# Patient Record
Sex: FEMALE | Race: ASIAN | NOT HISPANIC OR LATINO | Employment: UNEMPLOYED | URBAN - METROPOLITAN AREA
[De-identification: names, ages, dates, MRNs, and addresses within clinical notes are randomized per-mention and may not be internally consistent; named-entity substitution may affect disease eponyms.]

---

## 2017-08-31 ENCOUNTER — COMMUNICATION - HEALTHEAST (OUTPATIENT)
Dept: FAMILY MEDICINE | Facility: CLINIC | Age: 67
End: 2017-08-31

## 2017-09-20 ENCOUNTER — OFFICE VISIT - HEALTHEAST (OUTPATIENT)
Dept: FAMILY MEDICINE | Facility: CLINIC | Age: 67
End: 2017-09-20

## 2017-09-20 DIAGNOSIS — Z23 ENCOUNTER TO VACCINATE PATIENT: ICD-10-CM

## 2017-09-20 DIAGNOSIS — R07.89 LEFT-SIDED CHEST WALL PAIN: ICD-10-CM

## 2017-09-20 DIAGNOSIS — Z96.659 PAINFUL TOTAL KNEE REPLACEMENT (H): ICD-10-CM

## 2017-09-20 DIAGNOSIS — F43.10 PTSD (POST-TRAUMATIC STRESS DISORDER): ICD-10-CM

## 2017-09-20 DIAGNOSIS — T84.84XA PAINFUL TOTAL KNEE REPLACEMENT (H): ICD-10-CM

## 2017-09-20 DIAGNOSIS — Z76.89 ESTABLISHING CARE WITH NEW DOCTOR, ENCOUNTER FOR: ICD-10-CM

## 2017-09-20 ASSESSMENT — MIFFLIN-ST. JEOR: SCORE: 980.89

## 2017-11-07 ENCOUNTER — OFFICE VISIT - HEALTHEAST (OUTPATIENT)
Dept: FAMILY MEDICINE | Facility: CLINIC | Age: 67
End: 2017-11-07

## 2017-11-07 DIAGNOSIS — R07.89 LEFT-SIDED CHEST WALL PAIN: ICD-10-CM

## 2017-11-07 DIAGNOSIS — F32.A DEPRESSION: ICD-10-CM

## 2017-11-07 DIAGNOSIS — Z23 ENCOUNTER TO VACCINATE PATIENT: ICD-10-CM

## 2018-01-23 ENCOUNTER — OFFICE VISIT - HEALTHEAST (OUTPATIENT)
Dept: FAMILY MEDICINE | Facility: CLINIC | Age: 68
End: 2018-01-23

## 2018-01-23 DIAGNOSIS — R42 DIZZINESS: ICD-10-CM

## 2018-01-23 DIAGNOSIS — D64.9 ANEMIA, UNSPECIFIED TYPE: ICD-10-CM

## 2018-01-23 LAB
ALBUMIN SERPL-MCNC: 3.4 G/DL (ref 3.5–5)
ALP SERPL-CCNC: 117 U/L (ref 45–120)
ALT SERPL W P-5'-P-CCNC: 15 U/L (ref 0–45)
ANION GAP SERPL CALCULATED.3IONS-SCNC: 10 MMOL/L (ref 5–18)
AST SERPL W P-5'-P-CCNC: 17 U/L (ref 0–40)
BILIRUB SERPL-MCNC: 0.3 MG/DL (ref 0–1)
BUN SERPL-MCNC: 18 MG/DL (ref 8–22)
CALCIUM SERPL-MCNC: 9.1 MG/DL (ref 8.5–10.5)
CHLORIDE BLD-SCNC: 106 MMOL/L (ref 98–107)
CO2 SERPL-SCNC: 27 MMOL/L (ref 22–31)
CREAT SERPL-MCNC: 0.65 MG/DL (ref 0.6–1.1)
ERYTHROCYTE [DISTWIDTH] IN BLOOD BY AUTOMATED COUNT: 11.9 % (ref 11–14.5)
GFR SERPL CREATININE-BSD FRML MDRD: >60 ML/MIN/1.73M2
GLUCOSE BLD-MCNC: 96 MG/DL (ref 70–125)
HCT VFR BLD AUTO: 37.9 % (ref 35–47)
HGB BLD-MCNC: 12.7 G/DL (ref 12–16)
IRON SERPL-MCNC: 68 UG/DL (ref 42–175)
MCH RBC QN AUTO: 31.7 PG (ref 27–34)
MCHC RBC AUTO-ENTMCNC: 33.5 G/DL (ref 32–36)
MCV RBC AUTO: 95 FL (ref 80–100)
PLATELET # BLD AUTO: 282 THOU/UL (ref 140–440)
PMV BLD AUTO: 7.8 FL (ref 7–10)
POTASSIUM BLD-SCNC: 4.1 MMOL/L (ref 3.5–5)
PROT SERPL-MCNC: 6.9 G/DL (ref 6–8)
RBC # BLD AUTO: 4 MILL/UL (ref 3.8–5.4)
SODIUM SERPL-SCNC: 143 MMOL/L (ref 136–145)
TSH SERPL DL<=0.005 MIU/L-ACNC: 0.72 UIU/ML (ref 0.3–5)
WBC: 7.3 THOU/UL (ref 4–11)

## 2018-01-23 ASSESSMENT — MIFFLIN-ST. JEOR: SCORE: 990.82

## 2018-02-06 ENCOUNTER — OFFICE VISIT - HEALTHEAST (OUTPATIENT)
Dept: FAMILY MEDICINE | Facility: CLINIC | Age: 68
End: 2018-02-06

## 2018-02-06 ENCOUNTER — RECORDS - HEALTHEAST (OUTPATIENT)
Dept: GENERAL RADIOLOGY | Facility: CLINIC | Age: 68
End: 2018-02-06

## 2018-02-06 DIAGNOSIS — R42 DIZZINESS: ICD-10-CM

## 2018-02-06 DIAGNOSIS — M67.911 UNSPECIFIED DISORDER OF SYNOVIUM AND TENDON, RIGHT SHOULDER: ICD-10-CM

## 2018-02-06 DIAGNOSIS — M67.911 ROTATOR CUFF DISORDER, RIGHT: ICD-10-CM

## 2018-02-06 DIAGNOSIS — M25.511 SEVERE SHOULDER PAIN, RIGHT: ICD-10-CM

## 2018-02-06 DIAGNOSIS — F32.1 MODERATE SINGLE CURRENT EPISODE OF MAJOR DEPRESSIVE DISORDER (H): ICD-10-CM

## 2018-02-06 DIAGNOSIS — M79.10 MYALGIA: ICD-10-CM

## 2018-02-22 ENCOUNTER — OFFICE VISIT - HEALTHEAST (OUTPATIENT)
Dept: FAMILY MEDICINE | Facility: CLINIC | Age: 68
End: 2018-02-22

## 2018-02-22 ENCOUNTER — RECORDS - HEALTHEAST (OUTPATIENT)
Dept: GENERAL RADIOLOGY | Facility: CLINIC | Age: 68
End: 2018-02-22

## 2018-02-22 ENCOUNTER — RECORDS - HEALTHEAST (OUTPATIENT)
Dept: MAMMOGRAPHY | Facility: CLINIC | Age: 68
End: 2018-02-22

## 2018-02-22 DIAGNOSIS — M79.604 RIGHT LEG PAIN: ICD-10-CM

## 2018-02-22 DIAGNOSIS — Z12.31 VISIT FOR SCREENING MAMMOGRAM: ICD-10-CM

## 2018-02-22 DIAGNOSIS — Z12.31 ENCOUNTER FOR SCREENING MAMMOGRAM FOR MALIGNANT NEOPLASM OF BREAST: ICD-10-CM

## 2018-02-22 DIAGNOSIS — M79.604 PAIN IN RIGHT LEG: ICD-10-CM

## 2018-02-22 DIAGNOSIS — F32.A DEPRESSION: ICD-10-CM

## 2018-02-22 DIAGNOSIS — M25.511 RIGHT SHOULDER PAIN: ICD-10-CM

## 2018-03-07 ENCOUNTER — OFFICE VISIT - HEALTHEAST (OUTPATIENT)
Dept: FAMILY MEDICINE | Facility: CLINIC | Age: 68
End: 2018-03-07

## 2018-03-07 ENCOUNTER — HOME CARE/HOSPICE - HEALTHEAST (OUTPATIENT)
Dept: HOME HEALTH SERVICES | Facility: HOME HEALTH | Age: 68
End: 2018-03-07

## 2018-03-07 DIAGNOSIS — M25.511 RIGHT SHOULDER PAIN: ICD-10-CM

## 2018-03-07 DIAGNOSIS — S82.309A FRACTURE OF DISTAL END OF TIBIA: ICD-10-CM

## 2018-03-07 DIAGNOSIS — M79.604 RIGHT LEG PAIN: ICD-10-CM

## 2018-03-07 DIAGNOSIS — F32.A DEPRESSION: ICD-10-CM

## 2018-03-07 DIAGNOSIS — S42.301A RIGHT HUMERAL FRACTURE: ICD-10-CM

## 2018-03-08 ENCOUNTER — COMMUNICATION - HEALTHEAST (OUTPATIENT)
Dept: HOME HEALTH SERVICES | Facility: HOME HEALTH | Age: 68
End: 2018-03-08

## 2018-03-21 ENCOUNTER — RECORDS - HEALTHEAST (OUTPATIENT)
Dept: ADMINISTRATIVE | Facility: OTHER | Age: 68
End: 2018-03-21

## 2018-03-22 ENCOUNTER — AMBULATORY - HEALTHEAST (OUTPATIENT)
Dept: NURSING | Facility: CLINIC | Age: 68
End: 2018-03-22

## 2018-03-23 ENCOUNTER — COMMUNICATION - HEALTHEAST (OUTPATIENT)
Dept: FAMILY MEDICINE | Facility: CLINIC | Age: 68
End: 2018-03-23

## 2018-04-04 ENCOUNTER — RECORDS - HEALTHEAST (OUTPATIENT)
Dept: ADMINISTRATIVE | Facility: OTHER | Age: 68
End: 2018-04-04

## 2018-04-05 ENCOUNTER — COMMUNICATION - HEALTHEAST (OUTPATIENT)
Dept: FAMILY MEDICINE | Facility: CLINIC | Age: 68
End: 2018-04-05

## 2018-04-05 ENCOUNTER — OFFICE VISIT - HEALTHEAST (OUTPATIENT)
Dept: FAMILY MEDICINE | Facility: CLINIC | Age: 68
End: 2018-04-05

## 2018-04-05 DIAGNOSIS — F32.2 SEVERE SINGLE CURRENT EPISODE OF MAJOR DEPRESSIVE DISORDER, WITHOUT PSYCHOTIC FEATURES (H): ICD-10-CM

## 2018-04-05 DIAGNOSIS — M79.604 RIGHT LEG PAIN: ICD-10-CM

## 2018-04-05 DIAGNOSIS — K59.00 CONSTIPATED: ICD-10-CM

## 2018-04-05 DIAGNOSIS — M25.511 RIGHT SHOULDER PAIN: ICD-10-CM

## 2018-04-05 ASSESSMENT — MIFFLIN-ST. JEOR: SCORE: 1010.87

## 2018-04-06 ENCOUNTER — RECORDS - HEALTHEAST (OUTPATIENT)
Dept: ADMINISTRATIVE | Facility: OTHER | Age: 68
End: 2018-04-06

## 2018-04-13 ENCOUNTER — COMMUNICATION - HEALTHEAST (OUTPATIENT)
Dept: FAMILY MEDICINE | Facility: CLINIC | Age: 68
End: 2018-04-13

## 2018-04-26 ENCOUNTER — HOSPITAL ENCOUNTER (OUTPATIENT)
Dept: MAMMOGRAPHY | Facility: CLINIC | Age: 68
Discharge: HOME | End: 2018-04-26
Attending: FAMILY MEDICINE

## 2018-04-26 DIAGNOSIS — N64.89 BREAST ASYMMETRY: ICD-10-CM

## 2018-04-30 ENCOUNTER — AMBULATORY - HEALTHEAST (OUTPATIENT)
Dept: NURSING | Facility: CLINIC | Age: 68
End: 2018-04-30

## 2018-05-10 ENCOUNTER — RECORDS - HEALTHEAST (OUTPATIENT)
Dept: ADMINISTRATIVE | Facility: OTHER | Age: 68
End: 2018-05-10

## 2018-05-14 ENCOUNTER — COMMUNICATION - HEALTHEAST (OUTPATIENT)
Dept: FAMILY MEDICINE | Facility: CLINIC | Age: 68
End: 2018-05-14

## 2018-05-17 ENCOUNTER — OFFICE VISIT - HEALTHEAST (OUTPATIENT)
Dept: FAMILY MEDICINE | Facility: CLINIC | Age: 68
End: 2018-05-17

## 2018-05-17 DIAGNOSIS — F32.9 MAJOR DEPRESSIVE DISORDER WITH SINGLE EPISODE, REMISSION STATUS UNSPECIFIED: ICD-10-CM

## 2018-05-17 DIAGNOSIS — M25.511 CHRONIC RIGHT SHOULDER PAIN: ICD-10-CM

## 2018-05-17 DIAGNOSIS — Z13.220 SCREENING, LIPID: ICD-10-CM

## 2018-05-17 DIAGNOSIS — Z71.85 IMMUNIZATION COUNSELING: ICD-10-CM

## 2018-05-17 DIAGNOSIS — M79.604 RIGHT LEG PAIN: ICD-10-CM

## 2018-05-17 DIAGNOSIS — G89.29 CHRONIC RIGHT SHOULDER PAIN: ICD-10-CM

## 2018-05-17 LAB
CHOLEST SERPL-MCNC: 241 MG/DL
FASTING STATUS PATIENT QL REPORTED: NO
HDLC SERPL-MCNC: 40 MG/DL
LDLC SERPL CALC-MCNC: 166 MG/DL
TRIGL SERPL-MCNC: 173 MG/DL

## 2018-05-17 ASSESSMENT — MIFFLIN-ST. JEOR: SCORE: 1032.77

## 2018-05-18 ENCOUNTER — COMMUNICATION - HEALTHEAST (OUTPATIENT)
Dept: FAMILY MEDICINE | Facility: CLINIC | Age: 68
End: 2018-05-18

## 2018-06-19 ENCOUNTER — RECORDS - HEALTHEAST (OUTPATIENT)
Dept: ADMINISTRATIVE | Facility: OTHER | Age: 68
End: 2018-06-19

## 2018-08-10 ENCOUNTER — RECORDS - HEALTHEAST (OUTPATIENT)
Dept: ADMINISTRATIVE | Facility: OTHER | Age: 68
End: 2018-08-10

## 2018-08-17 ENCOUNTER — RECORDS - HEALTHEAST (OUTPATIENT)
Dept: ADMINISTRATIVE | Facility: OTHER | Age: 68
End: 2018-08-17

## 2018-08-17 ENCOUNTER — OFFICE VISIT - HEALTHEAST (OUTPATIENT)
Dept: FAMILY MEDICINE | Facility: CLINIC | Age: 68
End: 2018-08-17

## 2018-08-17 DIAGNOSIS — M79.604 RIGHT LEG PAIN: ICD-10-CM

## 2018-08-17 DIAGNOSIS — F32.9 MAJOR DEPRESSIVE DISORDER WITH SINGLE EPISODE, REMISSION STATUS UNSPECIFIED: ICD-10-CM

## 2018-08-17 DIAGNOSIS — S82.309A FRACTURE OF DISTAL END OF TIBIA: ICD-10-CM

## 2018-08-17 DIAGNOSIS — M25.511 CHRONIC RIGHT SHOULDER PAIN: ICD-10-CM

## 2018-08-17 DIAGNOSIS — G89.29 CHRONIC RIGHT SHOULDER PAIN: ICD-10-CM

## 2018-08-17 ASSESSMENT — MIFFLIN-ST. JEOR: SCORE: 1011.23

## 2018-09-25 ENCOUNTER — RECORDS - HEALTHEAST (OUTPATIENT)
Dept: ADMINISTRATIVE | Facility: OTHER | Age: 68
End: 2018-09-25

## 2018-09-25 ENCOUNTER — OFFICE VISIT - HEALTHEAST (OUTPATIENT)
Dept: FAMILY MEDICINE | Facility: CLINIC | Age: 68
End: 2018-09-25

## 2018-09-25 DIAGNOSIS — Z23 NEED FOR IMMUNIZATION AGAINST INFLUENZA: ICD-10-CM

## 2018-09-25 DIAGNOSIS — K59.01 SLOW TRANSIT CONSTIPATION: ICD-10-CM

## 2018-09-25 DIAGNOSIS — M79.604 RIGHT LEG PAIN: ICD-10-CM

## 2018-09-25 DIAGNOSIS — F32.9 MAJOR DEPRESSIVE DISORDER WITH SINGLE EPISODE, REMISSION STATUS UNSPECIFIED: ICD-10-CM

## 2018-09-25 ASSESSMENT — MIFFLIN-ST. JEOR: SCORE: 1022.57

## 2018-10-08 ENCOUNTER — OFFICE VISIT - HEALTHEAST (OUTPATIENT)
Dept: FAMILY MEDICINE | Facility: CLINIC | Age: 68
End: 2018-10-08

## 2018-10-08 DIAGNOSIS — N39.0 URINARY TRACT INFECTION: ICD-10-CM

## 2018-10-08 DIAGNOSIS — R30.0 DYSURIA: ICD-10-CM

## 2018-10-08 LAB
ALBUMIN UR-MCNC: ABNORMAL MG/DL
APPEARANCE UR: ABNORMAL
BACTERIA #/AREA URNS HPF: ABNORMAL HPF
BILIRUB UR QL STRIP: NEGATIVE
COLOR UR AUTO: YELLOW
GLUCOSE UR STRIP-MCNC: NEGATIVE MG/DL
HGB UR QL STRIP: ABNORMAL
KETONES UR STRIP-MCNC: ABNORMAL MG/DL
LEUKOCYTE ESTERASE UR QL STRIP: ABNORMAL
NITRATE UR QL: POSITIVE
PH UR STRIP: 6 [PH] (ref 5–8)
RBC #/AREA URNS AUTO: ABNORMAL HPF
SP GR UR STRIP: 1.01 (ref 1–1.03)
SQUAMOUS #/AREA URNS AUTO: ABNORMAL LPF
UROBILINOGEN UR STRIP-ACNC: ABNORMAL
WBC #/AREA URNS AUTO: ABNORMAL HPF

## 2018-10-08 ASSESSMENT — MIFFLIN-ST. JEOR: SCORE: 1008.96

## 2018-10-11 LAB — BACTERIA SPEC CULT: ABNORMAL

## 2018-12-18 ENCOUNTER — OFFICE VISIT - HEALTHEAST (OUTPATIENT)
Dept: FAMILY MEDICINE | Facility: CLINIC | Age: 68
End: 2018-12-18

## 2018-12-18 DIAGNOSIS — F32.9 MAJOR DEPRESSIVE DISORDER WITH SINGLE EPISODE, REMISSION STATUS UNSPECIFIED: ICD-10-CM

## 2018-12-18 DIAGNOSIS — K59.01 SLOW TRANSIT CONSTIPATION: ICD-10-CM

## 2018-12-18 DIAGNOSIS — M79.604 RIGHT LEG PAIN: ICD-10-CM

## 2018-12-18 DIAGNOSIS — M25.511 CHRONIC RIGHT SHOULDER PAIN: ICD-10-CM

## 2018-12-18 DIAGNOSIS — G89.29 CHRONIC RIGHT SHOULDER PAIN: ICD-10-CM

## 2018-12-18 ASSESSMENT — MIFFLIN-ST. JEOR: SCORE: 1016.9

## 2018-12-19 ENCOUNTER — RECORDS - HEALTHEAST (OUTPATIENT)
Dept: ADMINISTRATIVE | Facility: OTHER | Age: 68
End: 2018-12-19

## 2018-12-19 ENCOUNTER — COMMUNICATION - HEALTHEAST (OUTPATIENT)
Dept: ADMINISTRATIVE | Facility: CLINIC | Age: 68
End: 2018-12-19

## 2018-12-19 DIAGNOSIS — F32.9 MAJOR DEPRESSIVE DISORDER WITH SINGLE EPISODE, REMISSION STATUS UNSPECIFIED: ICD-10-CM

## 2019-04-15 ENCOUNTER — COMMUNICATION - HEALTHEAST (OUTPATIENT)
Dept: GERIATRIC MEDICINE | Facility: CLINIC | Age: 69
End: 2019-04-15

## 2019-04-17 ENCOUNTER — COMMUNICATION - HEALTHEAST (OUTPATIENT)
Dept: GERIATRIC MEDICINE | Facility: CLINIC | Age: 69
End: 2019-04-17

## 2019-04-18 ENCOUNTER — OFFICE VISIT - HEALTHEAST (OUTPATIENT)
Dept: FAMILY MEDICINE | Facility: CLINIC | Age: 69
End: 2019-04-18

## 2019-04-18 DIAGNOSIS — G89.29 CHRONIC RIGHT SHOULDER PAIN: ICD-10-CM

## 2019-04-18 DIAGNOSIS — Z23 IMMUNIZATION DUE: ICD-10-CM

## 2019-04-18 DIAGNOSIS — F32.9 MAJOR DEPRESSIVE DISORDER WITH SINGLE EPISODE, REMISSION STATUS UNSPECIFIED: ICD-10-CM

## 2019-04-18 DIAGNOSIS — M25.511 CHRONIC RIGHT SHOULDER PAIN: ICD-10-CM

## 2019-04-18 DIAGNOSIS — Z12.11 SCREEN FOR COLON CANCER: ICD-10-CM

## 2019-04-18 DIAGNOSIS — Z12.11 COLON CANCER SCREENING: ICD-10-CM

## 2019-04-18 DIAGNOSIS — M79.604 RIGHT LEG PAIN: ICD-10-CM

## 2019-04-18 ASSESSMENT — MIFFLIN-ST. JEOR: SCORE: 1010.66

## 2019-04-19 ENCOUNTER — RECORDS - HEALTHEAST (OUTPATIENT)
Dept: ADMINISTRATIVE | Facility: OTHER | Age: 69
End: 2019-04-19

## 2019-04-22 ENCOUNTER — COMMUNICATION - HEALTHEAST (OUTPATIENT)
Dept: GERIATRIC MEDICINE | Facility: CLINIC | Age: 69
End: 2019-04-22

## 2019-08-02 ENCOUNTER — OFFICE VISIT - HEALTHEAST (OUTPATIENT)
Dept: FAMILY MEDICINE | Facility: CLINIC | Age: 69
End: 2019-08-02

## 2019-08-02 DIAGNOSIS — G89.29 CHRONIC RIGHT SHOULDER PAIN: ICD-10-CM

## 2019-08-02 DIAGNOSIS — S42.211D CLOSED DISPLACED FRACTURE OF SURGICAL NECK OF RIGHT HUMERUS WITH ROUTINE HEALING, UNSPECIFIED FRACTURE MORPHOLOGY, SUBSEQUENT ENCOUNTER: ICD-10-CM

## 2019-08-02 DIAGNOSIS — F32.1 CURRENT MODERATE EPISODE OF MAJOR DEPRESSIVE DISORDER, UNSPECIFIED WHETHER RECURRENT (H): ICD-10-CM

## 2019-08-02 DIAGNOSIS — K59.01 SLOW TRANSIT CONSTIPATION: ICD-10-CM

## 2019-08-02 DIAGNOSIS — F32.9 MAJOR DEPRESSIVE DISORDER WITH SINGLE EPISODE, REMISSION STATUS UNSPECIFIED: ICD-10-CM

## 2019-08-02 DIAGNOSIS — E78.5 HYPERLIPIDEMIA LDL GOAL <160: ICD-10-CM

## 2019-08-02 DIAGNOSIS — M25.511 CHRONIC RIGHT SHOULDER PAIN: ICD-10-CM

## 2019-08-02 LAB
ANION GAP SERPL CALCULATED.3IONS-SCNC: 8 MMOL/L (ref 5–18)
BUN SERPL-MCNC: 11 MG/DL (ref 8–22)
CALCIUM SERPL-MCNC: 9.8 MG/DL (ref 8.5–10.5)
CHLORIDE BLD-SCNC: 106 MMOL/L (ref 98–107)
CHOLEST SERPL-MCNC: 239 MG/DL
CO2 SERPL-SCNC: 24 MMOL/L (ref 22–31)
CREAT SERPL-MCNC: 0.69 MG/DL (ref 0.6–1.1)
FASTING STATUS PATIENT QL REPORTED: YES
GFR SERPL CREATININE-BSD FRML MDRD: >60 ML/MIN/1.73M2
GLUCOSE BLD-MCNC: 95 MG/DL (ref 70–125)
HDLC SERPL-MCNC: 44 MG/DL
LDLC SERPL CALC-MCNC: 162 MG/DL
POTASSIUM BLD-SCNC: 4.2 MMOL/L (ref 3.5–5)
SODIUM SERPL-SCNC: 138 MMOL/L (ref 136–145)
TRIGL SERPL-MCNC: 163 MG/DL

## 2019-08-02 ASSESSMENT — MIFFLIN-ST. JEOR: SCORE: 1001.59

## 2019-08-08 ENCOUNTER — COMMUNICATION - HEALTHEAST (OUTPATIENT)
Dept: CARE COORDINATION | Facility: CLINIC | Age: 69
End: 2019-08-08

## 2019-10-26 ENCOUNTER — COMMUNICATION - HEALTHEAST (OUTPATIENT)
Dept: GERIATRIC MEDICINE | Facility: CLINIC | Age: 69
End: 2019-10-26

## 2019-11-04 ENCOUNTER — COMMUNICATION - HEALTHEAST (OUTPATIENT)
Dept: FAMILY MEDICINE | Facility: CLINIC | Age: 69
End: 2019-11-04

## 2019-11-08 ENCOUNTER — OFFICE VISIT - HEALTHEAST (OUTPATIENT)
Dept: FAMILY MEDICINE | Facility: CLINIC | Age: 69
End: 2019-11-08

## 2019-11-08 DIAGNOSIS — Z00.00 ROUTINE GENERAL MEDICAL EXAMINATION AT A HEALTH CARE FACILITY: ICD-10-CM

## 2019-11-08 DIAGNOSIS — M25.511 CHRONIC RIGHT SHOULDER PAIN: ICD-10-CM

## 2019-11-08 DIAGNOSIS — N39.3 SUI (STRESS URINARY INCONTINENCE, FEMALE): ICD-10-CM

## 2019-11-08 DIAGNOSIS — S42.211K CLOSED DISPLACED FRACTURE OF SURGICAL NECK OF RIGHT HUMERUS WITH NONUNION, UNSPECIFIED FRACTURE MORPHOLOGY, SUBSEQUENT ENCOUNTER: ICD-10-CM

## 2019-11-08 DIAGNOSIS — Z23 NEED FOR IMMUNIZATION AGAINST INFLUENZA: ICD-10-CM

## 2019-11-08 DIAGNOSIS — F32.9 MAJOR DEPRESSIVE DISORDER WITH SINGLE EPISODE, REMISSION STATUS UNSPECIFIED: ICD-10-CM

## 2019-11-08 DIAGNOSIS — G89.29 CHRONIC RIGHT SHOULDER PAIN: ICD-10-CM

## 2019-11-08 ASSESSMENT — PATIENT HEALTH QUESTIONNAIRE - PHQ9: SUM OF ALL RESPONSES TO PHQ QUESTIONS 1-9: 2

## 2019-11-08 ASSESSMENT — MIFFLIN-ST. JEOR: SCORE: 1006.98

## 2020-03-10 ENCOUNTER — OFFICE VISIT - HEALTHEAST (OUTPATIENT)
Dept: FAMILY MEDICINE | Facility: CLINIC | Age: 70
End: 2020-03-10

## 2020-03-10 DIAGNOSIS — E78.5 HYPERLIPIDEMIA LDL GOAL <160: ICD-10-CM

## 2020-03-10 DIAGNOSIS — K59.01 SLOW TRANSIT CONSTIPATION: ICD-10-CM

## 2020-03-10 DIAGNOSIS — S42.201G CLOSED FRACTURE OF PROXIMAL END OF RIGHT HUMERUS WITH DELAYED HEALING, UNSPECIFIED FRACTURE MORPHOLOGY, SUBSEQUENT ENCOUNTER: ICD-10-CM

## 2020-03-10 DIAGNOSIS — F32.9 MAJOR DEPRESSIVE DISORDER WITH SINGLE EPISODE, REMISSION STATUS UNSPECIFIED: ICD-10-CM

## 2020-03-10 LAB
CHOLEST SERPL-MCNC: 252 MG/DL
FASTING STATUS PATIENT QL REPORTED: YES
HDLC SERPL-MCNC: 41 MG/DL
LDLC SERPL CALC-MCNC: 185 MG/DL
TRIGL SERPL-MCNC: 132 MG/DL

## 2020-03-10 ASSESSMENT — MIFFLIN-ST. JEOR: SCORE: 992.52

## 2020-03-11 ENCOUNTER — COMMUNICATION - HEALTHEAST (OUTPATIENT)
Dept: GERIATRIC MEDICINE | Facility: CLINIC | Age: 70
End: 2020-03-11

## 2020-03-11 ASSESSMENT — ACTIVITIES OF DAILY LIVING (ADL): DEPENDENT_IADLS:: CLEANING;COOKING;LAUNDRY;SHOPPING;MEAL PREPARATION;TRANSPORTATION;MONEY MANAGEMENT

## 2020-03-13 ENCOUNTER — COMMUNICATION - HEALTHEAST (OUTPATIENT)
Dept: GERIATRIC MEDICINE | Facility: CLINIC | Age: 70
End: 2020-03-13

## 2020-03-31 ENCOUNTER — COMMUNICATION - HEALTHEAST (OUTPATIENT)
Dept: FAMILY MEDICINE | Facility: CLINIC | Age: 70
End: 2020-03-31

## 2020-07-13 ENCOUNTER — COMMUNICATION - HEALTHEAST (OUTPATIENT)
Dept: GERIATRIC MEDICINE | Facility: CLINIC | Age: 70
End: 2020-07-13

## 2020-07-15 ENCOUNTER — COMMUNICATION - HEALTHEAST (OUTPATIENT)
Dept: GERIATRIC MEDICINE | Facility: CLINIC | Age: 70
End: 2020-07-15

## 2020-07-16 ASSESSMENT — ACTIVITIES OF DAILY LIVING (ADL): DEPENDENT_IADLS:: CLEANING;COOKING;LAUNDRY;SHOPPING;MEAL PREPARATION;MONEY MANAGEMENT;TRANSPORTATION;INCONTINENCE

## 2020-07-20 ENCOUNTER — COMMUNICATION - HEALTHEAST (OUTPATIENT)
Dept: GERIATRIC MEDICINE | Facility: CLINIC | Age: 70
End: 2020-07-20

## 2020-08-11 ENCOUNTER — OFFICE VISIT - HEALTHEAST (OUTPATIENT)
Dept: FAMILY MEDICINE | Facility: CLINIC | Age: 70
End: 2020-08-11

## 2020-08-11 DIAGNOSIS — G89.29 CHRONIC RIGHT SHOULDER PAIN: ICD-10-CM

## 2020-08-11 DIAGNOSIS — F32.9 MAJOR DEPRESSIVE DISORDER WITH SINGLE EPISODE, REMISSION STATUS UNSPECIFIED: ICD-10-CM

## 2020-08-11 DIAGNOSIS — K59.01 SLOW TRANSIT CONSTIPATION: ICD-10-CM

## 2020-08-11 DIAGNOSIS — M79.604 RIGHT LEG PAIN: ICD-10-CM

## 2020-08-11 DIAGNOSIS — M25.511 CHRONIC RIGHT SHOULDER PAIN: ICD-10-CM

## 2020-08-11 RX ORDER — NAPROXEN 500 MG/1
500 TABLET ORAL 2 TIMES DAILY WITH MEALS
Qty: 60 TABLET | Refills: 9 | Status: SHIPPED | OUTPATIENT
Start: 2020-08-11 | End: 2022-08-23

## 2020-08-11 ASSESSMENT — MIFFLIN-ST. JEOR: SCORE: 982.88

## 2020-08-11 ASSESSMENT — PATIENT HEALTH QUESTIONNAIRE - PHQ9: SUM OF ALL RESPONSES TO PHQ QUESTIONS 1-9: 9

## 2020-09-08 ENCOUNTER — OFFICE VISIT - HEALTHEAST (OUTPATIENT)
Dept: FAMILY MEDICINE | Facility: CLINIC | Age: 70
End: 2020-09-08

## 2020-09-08 DIAGNOSIS — Z23 NEED FOR IMMUNIZATION AGAINST INFLUENZA: ICD-10-CM

## 2020-09-08 DIAGNOSIS — F32.9 MAJOR DEPRESSIVE DISORDER WITH SINGLE EPISODE, REMISSION STATUS UNSPECIFIED: ICD-10-CM

## 2020-09-08 DIAGNOSIS — M79.604 RIGHT LEG PAIN: ICD-10-CM

## 2020-09-08 DIAGNOSIS — Z23 INFLUENZA VACCINATION GIVEN: ICD-10-CM

## 2020-09-08 DIAGNOSIS — K59.01 SLOW TRANSIT CONSTIPATION: ICD-10-CM

## 2020-09-08 DIAGNOSIS — M25.511 CHRONIC RIGHT SHOULDER PAIN: ICD-10-CM

## 2020-09-08 DIAGNOSIS — G89.29 CHRONIC RIGHT SHOULDER PAIN: ICD-10-CM

## 2020-09-08 RX ORDER — ACETAMINOPHEN 325 MG/1
650 TABLET ORAL 3 TIMES DAILY
Qty: 100 TABLET | Refills: 2 | Status: SHIPPED | OUTPATIENT
Start: 2020-09-08

## 2020-09-08 RX ORDER — ESCITALOPRAM OXALATE 10 MG/1
10 TABLET ORAL DAILY
Qty: 30 TABLET | Refills: 8 | Status: SHIPPED | OUTPATIENT
Start: 2020-09-08 | End: 2022-06-08

## 2020-09-08 RX ORDER — POLYETHYLENE GLYCOL 3350 17 G/17G
17 POWDER, FOR SOLUTION ORAL DAILY
Qty: 1,700 G | Refills: 6 | Status: SHIPPED | OUTPATIENT
Start: 2020-09-08 | End: 2022-06-08

## 2020-09-08 ASSESSMENT — MIFFLIN-ST. JEOR: SCORE: 993.93

## 2020-12-08 ENCOUNTER — OFFICE VISIT - HEALTHEAST (OUTPATIENT)
Dept: FAMILY MEDICINE | Facility: CLINIC | Age: 70
End: 2020-12-08

## 2021-01-27 ENCOUNTER — RECORDS - HEALTHEAST (OUTPATIENT)
Dept: ADMINISTRATIVE | Facility: OTHER | Age: 71
End: 2021-01-27

## 2021-02-04 ENCOUNTER — COMMUNICATION - HEALTHEAST (OUTPATIENT)
Dept: GERIATRIC MEDICINE | Facility: CLINIC | Age: 71
End: 2021-02-04

## 2021-05-18 ENCOUNTER — AMBULATORY - HEALTHEAST (OUTPATIENT)
Dept: NURSING | Facility: CLINIC | Age: 71
End: 2021-05-18

## 2021-05-24 ENCOUNTER — RECORDS - HEALTHEAST (OUTPATIENT)
Dept: FAMILY MEDICINE | Facility: CLINIC | Age: 71
End: 2021-05-24

## 2021-05-24 DIAGNOSIS — Z12.31 ENCOUNTER FOR SCREENING MAMMOGRAM FOR BREAST CANCER: ICD-10-CM

## 2021-05-26 ASSESSMENT — PATIENT HEALTH QUESTIONNAIRE - PHQ9
SUM OF ALL RESPONSES TO PHQ QUESTIONS 1-9: 9
SUM OF ALL RESPONSES TO PHQ QUESTIONS 1-9: 2

## 2021-05-27 NOTE — PROGRESS NOTES
[facility] Care Coordination Contact  [facility] Home Visit Assessment     Home visit for Health Risk Assessment with Main Morris completed on 4/15/2019     Current Care Plan  Member currently receiving the following home care services:     Member currently receiving the following community resources:    PCA services    Medication Review  Medication reconciliation completed in Epic: YES  Medication set-up & administration: RN set up monthly  Family caregiver administers medications  Medication Risk Assessment Medication (1 or more, place referral to MTM):  N/A: No risk factors identified  MTM Referral Placed: No: No risk factors idenified    Mental/Behavioral Health   Depression Screening: See PHQ assessment flowsheet.          Mental Health Diagnosis: Yes:   Mental Health Services: None: No further intervention needed at this time.    Falls Assessment:no falls reported            ADL/IADL Dependencies:needs assistance with dressing, bathing, mobility and toileting           [facility] Health Plan sponsored benefits: Shared information re: Silver Sneakers/gym memberships, ASA, Calcium +D.    PCA Assessment completed at visit: yes    Elderly Waiver Eligibility: Yes, but member declines EW service; will not open to EW    Care Plan & Recommendations: will continue with formal PCA services.    See [facility] for detailed assessment information.    Follow-Up Plan: Member informed of future contact, plan to f/u with member with a 6 month telephone assessment.  Contact information shared with member and family, encouraged member to call with any questions or concerns at any time.    [place] care continuum providers: Please refer to Health Care Home on the [facility] Problem List to view this patient's [facility] Care Plan Summary.

## 2021-05-27 NOTE — PROGRESS NOTES
[facility] Care Coordination Contact    [facility]:  Emailed completed PCA assessment to [facility].  Secure emailed copy of PCA assessment to PCA Agency and mailed copy to member.  Faxed MD Communication to PCP.     Josefa Arthur  Care Management Specialist  [facility]  [phone]

## 2021-05-28 NOTE — PROGRESS NOTES
[facility] Care Coordination Contact    Received after visit checklist from care coordinator.  Completed following tasks: Mailed copy of care plan to client    Josefa Arthur  Care Management Specialist  [facility]  [phone]

## 2021-05-31 VITALS — WEIGHT: 128 LBS | BODY MASS INDEX: 27.22 KG/M2

## 2021-05-31 VITALS — BODY MASS INDEX: 26.57 KG/M2 | HEIGHT: 58 IN | WEIGHT: 126.56 LBS

## 2021-05-31 VITALS — BODY MASS INDEX: 27.03 KG/M2 | HEIGHT: 58 IN | WEIGHT: 128.75 LBS

## 2021-06-01 VITALS — BODY MASS INDEX: 27.94 KG/M2 | HEIGHT: 58 IN | WEIGHT: 133.1 LBS

## 2021-06-01 VITALS — WEIGHT: 128.06 LBS | BODY MASS INDEX: 27.23 KG/M2

## 2021-06-01 VITALS — BODY MASS INDEX: 27.98 KG/M2 | WEIGHT: 131.56 LBS

## 2021-06-01 VITALS — WEIGHT: 138 LBS | HEIGHT: 58 IN | BODY MASS INDEX: 28.97 KG/M2

## 2021-06-01 VITALS — WEIGHT: 133.25 LBS | HEIGHT: 58 IN | BODY MASS INDEX: 27.97 KG/M2

## 2021-06-01 VITALS — BODY MASS INDEX: 27.5 KG/M2 | WEIGHT: 129.31 LBS

## 2021-06-02 VITALS — HEIGHT: 58 IN | WEIGHT: 134.5 LBS | BODY MASS INDEX: 28.23 KG/M2

## 2021-06-02 VITALS — BODY MASS INDEX: 27.86 KG/M2 | WEIGHT: 132.75 LBS | HEIGHT: 58 IN

## 2021-06-02 VITALS — BODY MASS INDEX: 28.5 KG/M2 | WEIGHT: 135.75 LBS | HEIGHT: 58 IN

## 2021-06-02 NOTE — PROGRESS NOTES
[facility] Care Coordination Contact      [facility] Six-Month Telephone Assessment    6 month telephone assessment completed on 10/25/19.    ER visits: No  Hospitalizations: No  TCU stays: No  Significant health status changes: no, continues with chronic pain and depression.    Falls/Injuries: No  ADL/IADL changes: No  Changes in services: No    Caregiver Assessment follow up:  N/A    Goals: See POC in chart for goal progress documentation.      Will see member in 6 months for an annual health risk assessment.   Encouraged member to call CC with any questions or concerns in the meantime.    Soraida Brown RN  [facility]  [phone]

## 2021-06-03 VITALS
HEART RATE: 74 BPM | SYSTOLIC BLOOD PRESSURE: 120 MMHG | OXYGEN SATURATION: 99 % | TEMPERATURE: 98.3 F | HEIGHT: 58 IN | RESPIRATION RATE: 18 BRPM | WEIGHT: 132.31 LBS | DIASTOLIC BLOOD PRESSURE: 78 MMHG | BODY MASS INDEX: 27.77 KG/M2

## 2021-06-03 VITALS — HEIGHT: 58 IN | WEIGHT: 133.13 LBS | BODY MASS INDEX: 27.95 KG/M2

## 2021-06-03 VITALS — WEIGHT: 131.13 LBS | BODY MASS INDEX: 27.53 KG/M2 | HEIGHT: 58 IN

## 2021-06-03 NOTE — TELEPHONE ENCOUNTER
reports indicate that the patient needs colon cancer screening. If non-English speaking, CMT will schedule patient to discuss colon cancer screening options with PCP. Writer intends to contact the patient to assist in scheduling and appointing for this purpose. Per clinic policy, writer will three times prior to closing encounter.   Patient had ColoGuard ordered in April 2019. Results not received from [facility]. Did patient send this back?

## 2021-06-03 NOTE — TELEPHONE ENCOUNTER
CMT called all available numbers without success as numbers are invalid or non-working.     Patient has physical on 11/8 at [facility], notes updated.

## 2021-06-03 NOTE — PATIENT INSTRUCTIONS - HE
Preventative care:     Labs are up to date.    Flu shot given today.    Chronic right shoulder pain.    Continue naproxen and Tylenol.    Depression:     Continue Lexapro.    Constipation:     Continue Glycolax.    Urinary urgency:     Consider starting medication.    Follow up:     Return in 4 months.

## 2021-06-04 VITALS
DIASTOLIC BLOOD PRESSURE: 66 MMHG | TEMPERATURE: 98.2 F | BODY MASS INDEX: 27.17 KG/M2 | HEIGHT: 58 IN | HEART RATE: 72 BPM | RESPIRATION RATE: 20 BRPM | WEIGHT: 129.44 LBS | SYSTOLIC BLOOD PRESSURE: 112 MMHG

## 2021-06-04 VITALS
HEART RATE: 90 BPM | RESPIRATION RATE: 12 BRPM | HEIGHT: 58 IN | DIASTOLIC BLOOD PRESSURE: 70 MMHG | SYSTOLIC BLOOD PRESSURE: 116 MMHG | BODY MASS INDEX: 26.66 KG/M2 | WEIGHT: 127 LBS | TEMPERATURE: 97.4 F | OXYGEN SATURATION: 93 %

## 2021-06-04 VITALS
HEART RATE: 95 BPM | SYSTOLIC BLOOD PRESSURE: 108 MMHG | OXYGEN SATURATION: 97 % | TEMPERATURE: 98.1 F | WEIGHT: 129.13 LBS | HEIGHT: 58 IN | DIASTOLIC BLOOD PRESSURE: 66 MMHG | RESPIRATION RATE: 20 BRPM | BODY MASS INDEX: 27.1 KG/M2

## 2021-06-06 NOTE — PROGRESS NOTES
[facility] Care Coordination Contact  [facility] Home Visit Assessment     Home visit for Health Risk Assessment with Main Morris completed on 3/11/2020    Type of residence:: Apartment  Current living arrangement:: I live in a private home with spouse and family     Assessment completed with:: Patient, Children, Family, Spouse and .    Current Care Plan  Member currently receiving the following home care services: None  Member currently receiving the following community resources: PCA      Medication Review  Medication reconciliation completed in Epic: YES  Medication set-up & administration: Independent-does not set up  Self-administers medications  Medication Risk Assessment Medication (1 or more, place referral to MTM):  N/A: No risk factors identified  MTM Referral Placed: No: No risk factors idenified    Mental/Behavioral Health   Depression Screening: See PHQ assessment flowsheet.   Mental health DX:: Yes(Depression)   Mental health DX how managed:: Medication  Mental Health Diagnosis: Yes: depression; anxiety  Mental Health Services: None: No further intervention needed at this time.    Falls Assessment:   Fallen 2 or more times in the past year?: No   Any fall with injury in the past year?: No    ADL/IADL Dependencies:   Dependent ADLs:: Ambulation-walker, Bathing, Dressing, Grooming, Incontinence, Transfers  Dependent IADLs:: Cleaning, Cooking, Laundry, Shopping, Meal Preparation, Transportation, Money Management    [facility] Plan sponsored benefits: Shared information re: Silver Sneakers/gym memberships, ASA, Calcium +D.    PCA Assessment completed at visit: Yes    Elderly Waiver Eligibility: Yes, but member declines EW service; will not open to EW    Care Plan & Recommendations: safety goal for mobility.    See [facility] for detailed assessment information.    Follow-Up Plan: Member informed of future contact, plan to f/u with member with a 6 month telephone assessment.  Contact  information shared with member and family, encouraged member to call with any questions or concerns at any time.    Soraida Brown RN  [facility]  [phone]

## 2021-06-06 NOTE — PATIENT INSTRUCTIONS - HE
Hyperlipidemia:     Cholesterol labs ordered.    Eat less fried, oily, or fatty foods.    Chronic right shoulder and right leg pain:     Continue naproxen and Tylenol.     Depression:     Continue Lexapro.    Constipation:     Continue Glycolax.

## 2021-06-06 NOTE — PROGRESS NOTES
[facility] Care Coordination Contact    Member's MA termed 02/29/20; spoke with Grzegorz SHER who said that they sent in paperwork and MA would be eligible in 3 weeks per [facility] financial worker.  Insurance is [facility] MSC+.    Soraida Brown RN  [facility]  [phone]

## 2021-06-06 NOTE — PROGRESS NOTES
ASSESSMENT AND PLAN:  1. Slow transit constipation  Refill the medication for constipation she does have constipation with the use of naproxen  - polyethylene glycol (GLYCOLAX) 17 gram/dose powder; Take 17 g by mouth daily.  Dispense: 1700 g; Refill: 6    2. Major depressive disorder with single episode, remission status unspecified    No nightmares sleeping well no side effects noted from medication  - escitalopram oxalate (LEXAPRO) 10 MG tablet; Take 1 tablet (10 mg total) by mouth daily.  Dispense: 30 tablet; Refill: 8    3. Closed fracture of proximal end of right humerus with delayed healing, unspecified fracture morphology, subsequent encounter    Pain control has been adequate with the naproxen.  Continue the medication she has not had any side effects.    4. Hyperlipidemia LDL goal <160    Rechecking a lipid panel today her last lipid panel was elevated.  Currently she is not on statin.  - Lipid Cascade            Orders Placed This Encounter   Procedures     Lipid Cascade     Order Specific Question:   Fasting is required?     Answer:   Yes     Medications Discontinued During This Encounter   Medication Reason     polyethylene glycol (GLYCOLAX) 17 gram/dose powder Reorder     escitalopram oxalate (LEXAPRO) 10 MG tablet Reorder       Return in about 5 months (around 8/10/2020) for Recheck.    CHIEF COMPLAINT:  Chief Complaint   Patient presents with     Depression       HISTORY OF PRESENT ILLNESS:  Main is a 69 y.o. female  with chronic right shoulder pain, chronic right leg pain related to previous orthopedic injuries, major depression, and slow transit constipation, who presents to the clinic today for follow up on depression. Main is present with a Tracy . She reports that her chronic right shoulder and right leg pain are unchanged, and continues on naproxen and Tylenol for pain management. The patient has occasional constipation.     Her blood pressure is well-controlled today.     Her sleep has  "been okay. Her mood has been controlled.    I reviewed that her lipids from August 2019 were significant for elevated LDL and triglycerides. Mu is not eating pork, but does frequently eat fried foods. She has come in fasting today for labs.     She is no longer having issues with urinary incontinence.     REVIEW OF SYSTEMS:   GI: Occasional constipation.   : No urinary incontinence.  Musculoskeletal: Chronic right shoulder and right leg pain.    Psychiatric: Depression well-controlled.   All other10 point review systems are negative.    PFSH:  She is  and accompanied by her  today. Reviewed as below.     TOBACCO USE:  Social History     Tobacco Use   Smoking Status Current Some Day Smoker   Smokeless Tobacco Current User     Types: Snuff, Chew   Tobacco Comment    betel nut w/o tobacco       VITALS:  Vitals:    03/10/20 0942   BP: 108/66   Pulse: 95   Resp: 20   Temp: 98.1  F (36.7  C)   TempSrc: Oral   SpO2: 97%   Weight: 129 lb 2 oz (58.6 kg)   Height: 4' 9.5\" (1.461 m)     Wt Readings from Last 3 Encounters:   03/10/20 129 lb 2 oz (58.6 kg)   11/08/19 132 lb 5 oz (60 kg)   08/02/19 131 lb 2 oz (59.5 kg)     Body mass index is 27.46 kg/m .    QUALITY MEASURES:  The following are part of a depression follow up plan for the patient:  mental health care assessment and mental health care management    PHYSICAL EXAM:  General: Alert, cooperative, no distress, appears stated age  Head: Normocephalic, without obvious abnormality, atraumatic, moist mucous membranes  Eyes: PERRL, conjunctiva/cornea clear, EOM's intact  Neck: Supple, no cervical lymph node enlargement   Lungs: Clear to auscultation bilaterally, respirations unlabored  Heart: Regular rate and rhythm, S1 and S2 normal, no murmur, rub, or gallop  Neurologic:  A & O x 3.  No tremor, no focal findings.  Uses cane for assistance with ambulation.   Psychiatric: Normal affect, good eye contact, well-groomed  Skin: No rash or suspicious lesions noted " on exposed skin, non-diaphoretic    DATA REVIEWED:  Additional History from Old Records Summarized (2): none  Decision to Obtain Records (1): none  Radiology Tests Summarized or Ordered (1): none  Labs Reviewed or Ordered (1): 08/02/2019 labs reviewed. Labs reviewed.  Medicine Test Summarized or Ordered (1): none  Independent Review of EKG or X-RAY(2 each): none    IHanna, am scribing for and in the presence of, Dr. Mackey.    I, Dr. Mackey, personally performed the services described in this documentation, as scribed by Hanna Núñez in my presence, and it is both accurate and complete.    [place] time spent the patient today 25 minutes greater than 50% of the time was spent discussing depression hyperlipidemia musculoskeletal discomfort and constipation  MEDICATIONS:  Current Outpatient Medications   Medication Sig Dispense Refill     acetaminophen (TYLENOL) 325 MG tablet Take 2 tablets (650 mg total) by mouth 3 (three) times a day. 100 tablet 2     escitalopram oxalate (LEXAPRO) 10 MG tablet Take 1 tablet (10 mg total) by mouth daily. 30 tablet 8     naproxen (EC NAPROSYN) 500 MG EC tablet Take 1 tablet (500 mg total) by mouth 2 (two) times a day with meals. 60 tablet 9     polyethylene glycol (GLYCOLAX) 17 gram/dose powder Take 17 g by mouth daily. 1700 g 6     No current facility-administered medications for this visit.        Total Data Points: 1    Please note that this clinical encounter uses voice recognition software, there may be typographical errors present

## 2021-06-06 NOTE — PROGRESS NOTES
[facility] Care Coordination Contact    Received after visit chart from care coordinator.  Completed following tasks: Mailed copy of care plan to client    [facility]:  Emailed completed PCA assessment to [facility].  Faxed copy of PCA assessment to PCA Agency and mailed copy to member.  Faxed MD Communication to PCP.     Josefa Arthur  Care Management Specialist  [facility]  [phone]

## 2021-06-07 NOTE — TELEPHONE ENCOUNTER
Orders being requested: Incontinence supplies  Reason service is needed/diagnosis: Urinary Incontinence  When are orders needed by: ASAP  Where to send Orders: Fax: [phone]  Okay to leave detailed message?  No

## 2021-06-08 ENCOUNTER — AMBULATORY - HEALTHEAST (OUTPATIENT)
Dept: NURSING | Facility: CLINIC | Age: 71
End: 2021-06-08

## 2021-06-09 NOTE — PROGRESS NOTES
[facility] Care Coordination Contact    Member's MA is active again as of 07/01/20.   Criselda Garcia called to set up assessment for 07/15/20.    Soraida Brown RN  [facility]  [phone]

## 2021-06-09 NOTE — PROGRESS NOTES
[facility] Care Coordination Contact    Received after visit chart from care coordinator.  Completed following tasks: Mailed copy of care plan to client     [facility]:  Emailed completed PCA assessment to [facility].  Faxed copy of PCA assessment to PCA Agency and mailed copy to member.  Faxed MD Communication to PCP.     Mailed POC signature page and  PCA signature page to member to sign and return asap.      Josefa Arthur  Care Management Specialist  [facility]  [phone]

## 2021-06-09 NOTE — PROGRESS NOTES
[facility] Care Coordination Contact    [facility] Initial Assessment     Health Risk Assessment with Main Morris completed on 07/15/20 via phone due to covid-19 restrictions.  Type of residence:: Apartment  Current living arrangement:: I live in a private home with family     Assessment completed with:: Patient, Other, Spouse or significant other, Caregiver    Current Care Plan  Member currently receiving the following home care services: none  Member currently receiving the following community resources: PCA    Medication Review  Medication reconciliation completed in Epic: IF NO, PLEASE EXPLAIN due to phone assessment  Medication set-up & administration: Independent-does not set up  Self-administers medications  Medication Risk Assessment Medication (1 or more, place referral to MTM):  N/A: No risk factors identified  MTM Referral Placed: No: No risk factors idenified    Mental/Behavioral Health   PHQ-2 Total Score: 0       Mental health DX:: Yes   Mental health DX how managed:: Medication    Falls Assessment:   Fallen 2 or more times in the past year?: No   Any fall with injury in the past year?: No    ADL/IADL Dependencies:   Dependent ADLs:: Ambulation-walker, Incontinence, Transfers, Toileting, Dressing, Bathing  Dependent IADLs:: Cleaning, Cooking, Laundry, Shopping, Meal Preparation, Money Management, Transportation, Incontinence    [facility] Plan sponsored benefits: Shared information re: Silver Sneakers/gym memberships, ASA, Calcium +D.    PCA Assessment completed at visit: Yes Annual PCA assessment indicated 16 units per day of PCA. This is the same as the previous assessment.    Elderly Waiver Eligibility: Yes, but member declines EW service; will not open to EW    Care Plan & Recommendations: Main Morris wishes to reduce use of betel nut by 50% over next year.  Discussed cessation program, but wishes to do this on her own.    See CC for detailed assessment information.    Follow-Up Plan: Member  informed of future contact, plan to f/u with member with a 6 month telephone assessment.  Contact information shared with member and family, encouraged member to call with any questions or concerns at any time.    Soraida Brown RN  [facility]  [phone]

## 2021-06-09 NOTE — PROGRESS NOTES
[facility] Care Coordination Contact    Member became effective with  Karen on 7/1/20 with [employer] MSC+.  Previous Health Plan: MA/Fee For Service  Previous Care System: [facility]  Previous care coordinators name and number: same CC  Waiver Type: N/A  Last MMIS Entry: Date  and Type   MMIS visit date (and type) if different from above:   Services Listed in MMIS:   UTF received: same CC    Josefa Arthur  Care Management Specialist  [facility]  [phone]

## 2021-06-10 NOTE — PROGRESS NOTES
"ASSESSMENT and  Plan   1. Major depressive disorder with single episode, remission status unspecified  Refilled medication she has been off this for several months she feels tired and she not been sleeping  - escitalopram oxalate (LEXAPRO) 10 MG tablet; Take 1 tablet (10 mg total) by mouth daily.  Dispense: 30 tablet; Refill: 8    2. Slow transit constipation    He has been constipated  - polyethylene glycol (GLYCOLAX) 17 gram/dose powder; Take 17 g by mouth daily.  Dispense: 1700 g; Refill: 6    3. Chronic right shoulder pain  He has discomfort doing her household activities because she is been out of her naproxen  - naproxen (EC NAPROSYN) 500 MG EC tablet; Take 1 tablet (500 mg total) by mouth 2 (two) times a day with meals.  Dispense: 60 tablet; Refill: 9    4. Right leg pain  It is been difficult for her to walk because she does not  - acetaminophen (TYLENOL) 325 MG tablet; Take 2 tablets (650 mg total) by mouth 3 (three) times a day.  Dispense: 100 tablet; Refill: 2            There are no Patient Instructions on file for this visit.    No orders of the defined types were placed in this encounter.    Medications Discontinued During This Encounter   Medication Reason     escitalopram oxalate (LEXAPRO) 10 MG tablet Reorder     polyethylene glycol (GLYCOLAX) 17 gram/dose powder Reorder     naproxen (EC NAPROSYN) 500 MG EC tablet Reorder     acetaminophen (TYLENOL) 325 MG tablet Reorder       No follow-ups on file.    CHIEF COMPLAINT:  Chief Complaint   Patient presents with     Depression       HISTORY OF PRESENT ILLNESS:  Main is a 70 y.o. female who is here to follow-up, she is accompanied by her .  She reports that she feels \"and \"old.  She states she has been doing well for the last 2 months consequently she has not been picking up her medication also refills were present she says she did not understand that she had refills of her medication.  She states that she has been having increasing right arm and " "leg pain.  She also mentions that she feels tired but she cannot sleep she does state that her appetite is also diminished.  She has a medical history significant for right shoulder and leg pain secondary to fractures, major depression and insomnia and constipation  REVIEW OF SYSTEMS:   General positive for fatigue  Musculoskeletal positive for right arm and leg pain  GIT positive for constipation  10 point review of  All other systems are negative.    PFSH:  Social history obtained with the help of certified     TOBACCO USE:  Social History     Tobacco Use   Smoking Status Current Some Day Smoker   Smokeless Tobacco Current User     Types: Snuff, Chew   Tobacco Comment    betel nut w/o tobacco       VITALS:  Vitals:    08/11/20 0948   BP: 116/70   Patient Site: Left Arm   Patient Position: Sitting   Cuff Size: Adult Regular   Pulse: 90   Resp: 12   Temp: 97.4  F (36.3  C)   TempSrc: Oral   SpO2: 93%   Weight: 127 lb (57.6 kg)   Height: 4' 9.5\" (1.461 m)     Wt Readings from Last 3 Encounters:   08/11/20 127 lb (57.6 kg)   03/10/20 129 lb 2 oz (58.6 kg)   11/08/19 132 lb 5 oz (60 kg)       PHYSICAL EXAM:  Quiet interactive elderly appearing female sitting in exam in no acute distress  HEENT neck supple mucous membranes dry oral cavity shows no exudate no erythema no lymph enlargement  Respiratory system clear to auscultation equal breath sounds no wheeze no crackles  CVS regular rate rhythm no murmurs rubs gallops appreciated  Neuro cranial nerves II to XII intact gait is normal reflexes are brisk  Psych speech is soft poor eye contact grooming is adequate     DATA REVIEWED:  Additional History from Old Records Summarized (2): 0  Decision to Obtain Records (1): 0  Radiology Tests Summarized or Ordered (1): 0  Labs Reviewed or Ordered (1): 0  Medicine Test Summarized or Ordered (1): 0  Independent Review of EKG or X-RAY(2 each): 0    The visit lasted a total of 25 minutes face to face with the " patient. Over 50% of the time was spent counseling and educating the patient about   Chronic health conditions..    MEDICATIONS:  Current Outpatient Medications   Medication Sig Dispense Refill     acetaminophen (TYLENOL) 325 MG tablet Take 2 tablets (650 mg total) by mouth 3 (three) times a day. 100 tablet 2     escitalopram oxalate (LEXAPRO) 10 MG tablet Take 1 tablet (10 mg total) by mouth daily. 30 tablet 8     naproxen (EC NAPROSYN) 500 MG EC tablet Take 1 tablet (500 mg total) by mouth 2 (two) times a day with meals. 60 tablet 9     polyethylene glycol (GLYCOLAX) 17 gram/dose powder Take 17 g by mouth daily. 1700 g 6     No current facility-administered medications for this visit.      Albert OLIVER

## 2021-06-10 NOTE — PROGRESS NOTES
[facility] Care Coordination Contact    Faxed signed pca sig page to [facility] and provider.     Josefa Arthur  Care Management Specialist  [facility]  [phone]

## 2021-06-11 NOTE — PROGRESS NOTES
ASSESSMENT and plan  1. Chronic right shoulder pain she has chronic right shoulder pain which is been controlled on naproxen and Tylenol.  Secondary to a right humeral fracture.  She denies any discomfort today.    2. Major depressive disorder with single episode, remission status unspecified currently sleeping well no nightmares noted her  reports that she is feeling well at home and talks to them regularly.   escitalopram oxalate (LEXAPRO) 10 MG tablet   3. Right leg pain distal tibial fracture Tylenol naproxen working no side effects noted. acetaminophen (TYLENOL) 325 MG tablet   4. Slow transit constipation medication refilled no constipation noted polyethylene glycol (GLYCOLAX) 17 gram/dose powder   5. Influenza vaccination given agrees to immunization today          There are no Patient Instructions on file for this visit.    No orders of the defined types were placed in this encounter.    Medications Discontinued During This Encounter   Medication Reason     escitalopram oxalate (LEXAPRO) 10 MG tablet Reorder     acetaminophen (TYLENOL) 325 MG tablet Reorder     polyethylene glycol (GLYCOLAX) 17 gram/dose powder Reorder       No follow-ups on file.    CHIEF COMPLAINT:  Chief Complaint   Patient presents with     Depression       HISTORY OF PRESENT ILLNESS:  Main is a 70 y.o. female who is here with her  who is here for a follow-up.  She reports that her right shoulder and leg pain are controllable with the help of the medication because she is taking medication regularly she is able to sleep.  She is able to do some housework and helps help with cooking.  She can do some self grooming and walking has minimal discomfort.  She does not have any constipation at this time.    REVIEW OF SYSTEMS:   Musculoskeletal right shoulder pain right leg pain noted.  10 point review of  All other systems are negative.    PFSH:     not working    TOBACCO USE:  Social History     Tobacco Use   Smoking Status  "Current Some Day Smoker   Smokeless Tobacco Current User     Types: Snuff, Chew   Tobacco Comment    betel nut w/o tobacco       VITALS:  Vitals:    09/08/20 0955   BP: 112/66   Pulse: 72   Resp: 20   Temp: 98.2  F (36.8  C)   TempSrc: Oral   Weight: 129 lb 7 oz (58.7 kg)   Height: 4' 9.5\" (1.461 m)     Wt Readings from Last 3 Encounters:   09/08/20 129 lb 7 oz (58.7 kg)   08/11/20 127 lb (57.6 kg)   03/10/20 129 lb 2 oz (58.6 kg)       PHYSICAL EXAM:  Interactive elderly female sitting comfortably exam no acute distress  HEENT neck supple mucous membranes moist  Musculoskeletal system tenderness over the posterior aspect of the deltoid noted.  She has no tenderness over the right biceps or triceps.  There is no tenderness over the lower spine.  Psych oriented x3 not agitated well-groomed seems comfortable at rest  Neuro cranial nerves II to XII intact gait is normal reflexes are brisk    DATA REVIEWED:  Additional History from Old Records Summarized (2): 0  Decision to Obtain Records (1): 0  Radiology Tests Summarized or Ordered (1): 0  Labs Reviewed or Ordered (1): 0  Medicine Test Summarized or Ordered (1): 0  Independent Review of EKG or X-RAY(2 each): 0    The visit lasted a total of 25 minutes face to face with the patient. Over 50% of the time was spent counseling and educating the patient about   Vaccination, constipation, depression, shoulder pain, leg pain..    MEDICATIONS:  Current Outpatient Medications   Medication Sig Dispense Refill     acetaminophen (TYLENOL) 325 MG tablet Take 2 tablets (650 mg total) by mouth 3 (three) times a day. 100 tablet 2     escitalopram oxalate (LEXAPRO) 10 MG tablet Take 1 tablet (10 mg total) by mouth daily. 30 tablet 8     naproxen (EC NAPROSYN) 500 MG EC tablet Take 1 tablet (500 mg total) by mouth 2 (two) times a day with meals. 60 tablet 9     polyethylene glycol (GLYCOLAX) 17 gram/dose powder Take 17 g by mouth daily. 1700 g 6     No current facility-administered " medications for this visit.      john Mackey MD
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
## 2021-06-14 NOTE — PROGRESS NOTES
ASSESSMENT AND PLAN:  1. Depression noticed improvement with sleep.  Last flashbacks Lexapro was completed I refilled the medication and have her back in 6 weeks a family member will return to corroborate her response    2. Left-sided chest wall pain had improvement with naproxen 375 mg increasing to 500 mg still complaining of left-sided chest wall discomfort which is worse at night or when she tries to lie down on the area.    3. Encounter to vaccinate patient pneumonia vaccine and Tdap given today        CHIEF COMPLAINT:  Chief Complaint   Patient presents with     Dizziness     other     L side pain, under the breast per pt.       HISTORY OF PRESENT ILLNESS:  Main is a 67 y.o. female presenting for a follow-up. Main is present with a Tracy . She is still experiencing left chest wall pain. She has not taken naproxen for the past month but she notes that the naproxen slightly improved her pain while she was taking it. The pain often keeps her awake at night time.     REVIEW OF SYSTEMS:   She denies nausea and vomiting.    All other 10 point review of systems are negative.    PFSH:   Pertinent past, family, social and medical history reviewed.     TOBACCO USE:  History   Smoking Status     Current Some Day Smoker   Smokeless Tobacco     Current User     Types: Snuff, Chew     Comment: betel nut w/o tobacco       VITALS:  Vitals:    11/07/17 1307   BP: 104/70   Patient Site: Left Arm   Patient Position: Sitting   Cuff Size: Adult Regular   Pulse: 91   Temp: 97.8  F (36.6  C)   TempSrc: Oral   SpO2: 97%   Weight: 128 lb (58.1 kg)     Wt Readings from Last 3 Encounters:   11/07/17 128 lb (58.1 kg)   09/20/17 126 lb 9 oz (57.4 kg)     Body mass index is 27.22 kg/(m^2).    PHYSICAL EXAM:  General: Alert, cooperative, no distress, appears stated age  Head: Normocephalic, without obvious abnormality, atraumatic  Musculoskeletal: Tenderness over left 5th and 6th rib.   Lungs: Clear to auscultation bilaterally,  respirations unlabored  Heart: Regular rate and rhythm, S1 and S2 normal, no murmur, rub, or gallop  CVS: No edema noted.   Abdomen: Soft, non tender, bowel sounds active all four quadrants, no masses, no organomegaly.  Neurologic: No tremor, no focal findings.   Psych: Oriented x3. Affect normal.     DATA REVIEWED:  Additional History from Old Records Summarized (2): None  Decision to Obtain Records (1): None  Radiology Tests Summarized or Ordered (1): none  Labs Reviewed or Ordered (1): none  Medicine Test Summarized or Ordered (1): none  Independent Review of EKG or X-RAY(2 each): None    The visit lasted a total of 10 minutes face to face with the patient. Over 50% of the time was spent counseling and educating the patient about left rib pain.     Eleonora MAC, am scribing for and in the presence of, Dr. Mackey.    john MAC personally performed the services described in this documentation, as scribed by Eleonora Jarvis in my presence, and it is both accurate and complete.      MEDICATIONS:  Current Outpatient Prescriptions   Medication Sig Dispense Refill     escitalopram oxalate (LEXAPRO) 10 MG tablet Take 1 tablet (10 mg total) by mouth daily. 30 tablet 2     naproxen (NAPROSYN) 375 MG tablet Take 1 tablet (375 mg total) by mouth 2 (two) times a day with meals. 60 tablet 2     No current facility-administered medications for this visit.        Total Data Points: 0
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
## 2021-06-15 NOTE — PROGRESS NOTES
[facility] Care Coordination      [facility] Six-Month Telephone Assessment    6 month telephone assessment completed on 02/04/21.    ER visits: No  Hospitalizations: No  TCU stays: No  Significant health status changes: no  Falls/Injuries: No  ADL/IADL changes: No  Changes in services: No    Caregiver Assessment follow up:  Na, formal PCA services continue.    Goals: See POC in chart for goal progress documentation.  Continues to reduce use of betel nut.  Using 2x/day at this time.    Will see member in 6 months for an annual health risk assessment.   Encouraged member to call CC with any questions or concerns in the meantime.     Added note: Very difficult getting a [employer] [place]  on Language Line.  Finally was connected to [employer]; he speaks [employer] and Haritha.    Soraida Brown RN  [facility]  [phone]

## 2021-06-16 PROBLEM — S42.301A RIGHT HUMERAL FRACTURE: Status: ACTIVE | Noted: 2018-03-07

## 2021-06-16 PROBLEM — N39.3 SUI (STRESS URINARY INCONTINENCE, FEMALE): Status: ACTIVE | Noted: 2019-11-08

## 2021-06-16 PROBLEM — F32.A DEPRESSION: Status: ACTIVE | Noted: 2018-02-22

## 2021-06-16 PROBLEM — M25.511 RIGHT SHOULDER PAIN: Status: ACTIVE | Noted: 2018-02-22

## 2021-06-16 PROBLEM — S82.309A FRACTURE OF DISTAL END OF TIBIA: Status: ACTIVE | Noted: 2018-03-07

## 2021-06-16 PROBLEM — M79.604 RIGHT LEG PAIN: Status: ACTIVE | Noted: 2018-02-22

## 2021-06-16 PROBLEM — K59.01 SLOW TRANSIT CONSTIPATION: Status: ACTIVE | Noted: 2018-09-25

## 2021-06-16 NOTE — PROGRESS NOTES
Main came in with her  Mere, as both are needing assessments for PCA services. They came with their neighbor Olive, Olive who is their neighbor and person who helps them. They have a daughter but she has disabilities. SW made copies of their [facility] information and will fax in the request to [facility] on their behalf, they gave SW verbal approval to do so.
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
## 2021-06-17 NOTE — PROGRESS NOTES
"ASSESSMENT AND PLAN:  1. Right leg pain PT is started at home.  She is using naproxen correctly mentions her pain has decreased however still has difficulty ambulating.  She is awaiting medical equipment including a shower chair which I have signed off on previously.    2. Right shoulder pain she has difficulty elevating her right shoulder she cannot abduct her right shoulder or internally rotated without significant discomfort and is not using her right hand for any household tasks.  Naproxen is helping with the pain    3. Severe single current episode of major depressive disorder, without psychotic features she is more animated today.  The Lexapro is helping her relax.  No side effects noted from the medication    4. Constipated she only has 1 bowel movement every 10 days have given her some fiber to use follow-up in 6 weeks recommended        CHIEF COMPLAINT:  Chief Complaint   Patient presents with     Depression       HISTORY OF PRESENT ILLNESS:  Main is a 67 y.o. female presenting for a follow-up of depression. Main is present with a Tracy ,  and friend. She is taking her Lexapro faithfully. She has been able to sleep well. She states that she is not worried about anything.     Right Sided Pain:She has a nurse coming to her home to help her with physical therapy exercises. She states that her right shoulder and right leg pain has improved with naproxen and exercises. She notes that her pain worsens with movement of her right arm. She usually uses her left arm instead. She denies epigastric abdominal pain secondary to naproxen use.     REVIEW OF SYSTEMS:   She states that she has one bowel movement every 10 days. She states that his bowel movement schedule has been present for \"a long time\".   All other 10 point review of systems are negative.    PFSH:  . Pertinent past, family, social and medical history reviewed.     TOBACCO USE:  History   Smoking Status     Current Some Day Smoker   Smokeless " "Tobacco     Current User     Types: Snuff, Chew     Comment: betel nut w/o tobacco       VITALS:  Vitals:    04/05/18 0741   BP: 100/68   Pulse: 80   Resp: 16   Temp: 98.5  F (36.9  C)   TempSrc: Oral   SpO2: 95%   Weight: 133 lb 1.6 oz (60.4 kg)   Height: 4' 9.52\" (1.461 m)     Wt Readings from Last 3 Encounters:   04/05/18 133 lb 1.6 oz (60.4 kg)   03/07/18 131 lb 9 oz (59.7 kg)   02/22/18 129 lb 5 oz (58.7 kg)     Body mass index is 28.28 kg/(m^2).    PHYSICAL EXAM:  General: Alert, cooperative, no distress, appears stated age  Head: Normocephalic, without obvious abnormality, atraumatic  Lungs: Clear to auscultation bilaterally, respirations unlabored  Chest wall: No tenderness or deformity  Heart: Regular rate and rhythm, S1 and S2 normal, no murmur, rub, or gallop  CVS: No edema noted.   Abdomen: Soft, non tender, bowel sounds active all four quadrants, no masses, no organomegaly.  Neurologic: No tremor, no focal findings.     Psych: Oriented x3. Affect normal.     DATA REVIEWED:  Additional History from Old Records Summarized (2): None  Decision to Obtain Records (1): None  Radiology Tests Summarized or Ordered (1): None  Labs Reviewed or Ordered (1): none  Medicine Test Summarized or Ordered (1): None  Independent Review of EKG or X-RAY(2 each): none    The visit lasted a total of 26 minutes face to face with the patient. Over 50% of the time was spent counseling and educating the patient about follow-up.  For muscular pain, constipation and depression    Eleonora MAC, am scribing for and in the presence of, Dr. Mackey.    john MAC, personally performed the services described in this documentation, as scribed by Eleonora Jarvis in my presence, and it is both accurate and complete.      MEDICATIONS:  Current Outpatient Prescriptions   Medication Sig Dispense Refill     escitalopram oxalate (LEXAPRO) 10 MG tablet Take 1 tablet (10 mg total) by mouth daily. 30 tablet 4     naproxen (EC NAPROSYN) " 500 MG EC tablet Take 1 tablet (500 mg total) by mouth 2 (two) times a day with meals. 60 tablet 2     naproxen (NAPROSYN) 500 MG tablet   2     No current facility-administered medications for this visit.        Total Data Points: 0

## 2021-06-17 NOTE — PROGRESS NOTES
Main and her  [place] came in to see KAREN today. SW has been trying to reach the [facility] coordinator at [facility], she has not yet returned calls or email. [facility] is a partner agency for [employer], so clients need to be referred by them. Main and her  are familiar with [facility] and their office location. KAREN gave them a card with Sienna Steward's name on it and they will go to [facility] to see her. KAREN sent message to nazario's PCP. Diagnosis codes are needed in order to submit a PCA referral. Main and her care giver Olive Schaefer have not had a call from [facility] to complete her PCA assessment. They may be waiting for her husbands referral.

## 2021-06-18 NOTE — PROGRESS NOTES
"ASSESSMENT AND PLAN:  1. Screening, lipid check a lipid panel for today she has not had one done in the immediate past Lipid Profile   2. Major depressive disorder with single episode, remission status unspecified on Lexapro no side effects noted sleeping well mood is elevated able to do some household tasks reports she is not sad or tired    3. Chronic right shoulder pain currently taking Naprosyn able to use her arm for some minor tasks at home denies any rest pain on the shoulder or the area of the hardware insertion in the right arm    4. Right leg pain minimal discomfort at rest can abduct and abduct her hips without pain.    5. Immunization counseling varicella given today        CHIEF COMPLAINT:  Chief Complaint   Patient presents with     Depression       HISTORY OF PRESENT ILLNESS:  Main is a 68 y.o. female presenting for a follow-up. Main is present with a Tracy .     Depression: She states she is taking Lexapro faithfully. She feels that her mood and sleep quality have improved since last visit.     Right-Sided Pain: She notes that her right-sided pain has improved with naproxen. Her pain does not worsen unless she is walking or doing household chores.    Constipation: Her constipation is currently controlled with Colace.      REVIEW OF SYSTEMS:   She denies nausea.    All other 10 point review of systems are negative.    PFSH:  Have not received furniture in household.  Pertinent past, family, social and medical history reviewed.     TOBACCO USE:  History   Smoking Status     Current Some Day Smoker   Smokeless Tobacco     Current User     Types: Snuff, Chew     Comment: betel nut w/o tobacco       VITALS:  Vitals:    05/17/18 1146   BP: 106/66   Patient Site: Left Arm   Patient Position: Sitting   Cuff Size: Adult Regular   Pulse: 92   Resp: 16   Temp: 97.4  F (36.3  C)   TempSrc: Oral   Weight: 138 lb (62.6 kg)   Height: 4' 9.5\" (1.461 m)     Wt Readings from Last 3 Encounters:   05/17/18 138 lb " (62.6 kg)   04/05/18 133 lb 1.6 oz (60.4 kg)   03/07/18 131 lb 9 oz (59.7 kg)     Body mass index is 29.35 kg/(m^2).    PHYSICAL EXAM:  General: Alert, cooperative, no distress, appears stated age  Neurologic: No tremor, no focal findings.    Psych: Oriented x3. Affect normal.     DATA REVIEWED:  Additional History from Old Records Summarized (2): None  Decision to Obtain Records (1): none  Radiology Tests Summarized or Ordered (1): None  Labs Reviewed or Ordered (1): Labs ordered.   Medicine Test Summarized or Ordered (1):   Independent Review of EKG or X-RAY(2 each): None    The visit lasted a total of 13 minutes face to face with the patient. Over 50% of the time was spent counseling and educating the patient about depression and shoulder pain.     IEleonora, am scribing for and in the presence of, Dr. Mackey.    Ijohn personally performed the services described in this documentation, as scribed by Eleonora Jarvis in my presence, and it is both accurate and complete.      MEDICATIONS:  Current Outpatient Prescriptions   Medication Sig Dispense Refill     escitalopram oxalate (LEXAPRO) 10 MG tablet Take 1 tablet (10 mg total) by mouth daily. 30 tablet 4     naproxen (EC NAPROSYN) 500 MG EC tablet Take 1 tablet (500 mg total) by mouth 2 (two) times a day with meals. 60 tablet 2     naproxen (NAPROSYN) 500 MG tablet Take 1 tablet (500 mg total) by mouth 2 (two) times a day with meals. 60 tablet 6     polyethylene glycol (GLYCOLAX) 17 gram/dose powder Take 17 g by mouth daily. 1700 g 6     No current facility-administered medications for this visit.        Total Data Points: 1

## 2021-06-19 NOTE — LETTER
Letter by Soraida Brown RN at      Author: Soraida Brown RN Service: -- Author Type: --    Filed:  Encounter Date: 4/17/2019 Status: (Other)       [address], [address]  [place] [address]  Phone:  [phone]  Fax:  [phone]        April 17, 2019    MAIN BOOGIE  [address]  [place] [address]    Dear Main,    Enclosed is a copy of your completed PCA Assessment and Service Plan.  This is for your records and no action is required by you.  If you have additional questions regarding your assessment please contact me at [phone]. If you feel that your needs are not being met, please contact the Clinical Supervisor at [phone].    Sincerely,      Soraida Brown RN    E-mail: [email or web address]  Phone: [phone]    Care Manager  [facility]            Enclosure:  Completed PCA assessment
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
## 2021-06-20 NOTE — LETTER
Letter by Soraida Brown RN at      Author: Soraida Brown RN Service: -- Author Type: --    Filed:  Encounter Date: 3/13/2020 Status: (Other)       March 13, 2020    MAIN BOOGIE  [address]  [place] [address]        Dear Main:    At [facility], we are dedicated to improving your health and well-being. Enclosed is the Comprehensive Care Plan that we developed with you on 3/11/20. Please review the Care Plan carefully.    As a reminder, some of the things we discussed at your visit include:    Your physical and mental health    Ways to reduce falls    Health care needs you may have    Dont forget to contact your care coordinator if you:    Have been hospitalized or plan to be hospitalized     Have had a fall     Have experienced a change in physical health    Are experiencing emotional problems     If you do not agree with your Care Plan, have questions about it, or have experienced a change in your needs, please call me at [phone]. If you are hearing impaired, please call the [place] [employer] at 953 or [phone] ([id number]-[id number] relay service).    Sincerely,          Soraida Brown RN    E-mail: [email or web address]  Phone: [phone]    Care Manager  [id number] [place] [id number]     [id number] (11/18)

## 2021-06-20 NOTE — LETTER
Letter by Soraida Brown RN at      Author: Soraida Brown RN Service: -- Author Type: --    Filed:  Encounter Date: 7/20/2020 Status: (Other)       July 20, 2020    MAIN KEAGAN  [address]  [place] [address]        Dear Main:    At [facility], we are dedicated to improving your health and well-being. Enclosed is the Comprehensive Care Plan that we developed with you on 7/15/20. Please review the Care Plan carefully.    As a reminder, some of the things we discussed at your visit include:    Your physical and mental health    Ways to reduce falls    Health care needs you may have    Dont forget to contact your care coordinator if you:    Have been hospitalized or plan to be hospitalized     Have had a fall     Have experienced a change in physical health    Are experiencing emotional problems     If you do not agree with your Care Plan, have questions about it, or have experienced a change in your needs, please call me at [phone]. If you are hearing impaired, please call the [place] Relay at 741 or [phone] (xsiyad-fq-tgrtud relay service).    Sincerely,          Soraida Brown RN    E-mail: [email or web address]  Phone: [phone]    Care Manager  [facility]+[id number] [place] [id number]     [id number] (11/18)

## 2021-06-20 NOTE — PROGRESS NOTES
"  Chief Complaint   Patient presents with     Fever     Pain     head, body, and pain urination          HPI:   Main Morris is a 68 y.o. female with  with PCA and  c/o fever and pain urinating for the last two days.  Has been nauseated.  Drinking fluids.  No vomiting.  No diarrhea.  No hematuria.  Back of neck hurts.  Last dose of antipyretic 6 hours ago.    ROS:  A 10 point comprehensive review of systems was negative except as noted.     Medications:  Current Outpatient Prescriptions on File Prior to Visit   Medication Sig Dispense Refill     acetaminophen (TYLENOL) 325 MG tablet Take 2 tablets (650 mg total) by mouth 3 (three) times a day. 100 tablet 2     escitalopram oxalate (LEXAPRO) 10 MG tablet Take 1 tablet (10 mg total) by mouth daily. 30 tablet 4     naproxen (EC NAPROSYN) 500 MG EC tablet Take 1 tablet (500 mg total) by mouth 2 (two) times a day with meals. 60 tablet 2     naproxen (NAPROSYN) 500 MG tablet Take 1 tablet (500 mg total) by mouth 2 (two) times a day with meals. 60 tablet 6     polyethylene glycol (GLYCOLAX) 17 gram/dose powder Take 17 g by mouth daily. 1700 g 6     No current facility-administered medications on file prior to visit.          Social History:  Social History   Substance Use Topics     Smoking status: Current Some Day Smoker     Smokeless tobacco: Current User     Types: Snuff, Chew      Comment: betel nut w/o tobacco     Alcohol use No         Physical Exam:   Vitals:    10/08/18 1746   BP: 98/70   Patient Site: Right Arm   Patient Position: Sitting   Cuff Size: Adult Regular   Pulse: 96   Resp: 20   Temp: 98.9  F (37.2  C)   TempSrc: Oral   Weight: 132 lb 12 oz (60.2 kg)   Height: 4' 9.5\" (1.461 m)       GENERAL:   Alert. Oriented.  EYES: Clear  HENT:  Ears: R TM pearly gray. Normal landmarks. L TM pearly gray.  Normal landmarks  Nose: Clear.  Sinuses: Nontender.  Oropharynx:  No erythema. No exudate.  NECK: Supple. No adenopathy.  LUNGS: Clear to ascultation.  " No crackles.  No wheezing  HEART: RRR  SKIN:  No rash.   ABDOMEN:  +BS. Mild suprapubic  tenderness. Soft, no guarding, rebound, rigidity,mass, or organomegaly. No CVA tenderness      LABS:  Results for orders placed or performed in visit on 10/08/18   Urinalysis-UC if Indicated   Result Value Ref Range    Color, UA Yellow Colorless, Yellow, Straw, Light Yellow    Clarity, UA Slightly Cloudy (!) Clear    Glucose, UA Negative Negative    Bilirubin, UA Negative Negative    Ketones, UA 40 mg/dL (!) Negative    Specific Gravity, UA 1.010 1.005 - 1.030    Blood, UA Moderate (!) Negative    pH, UA 6.0 5.0 - 8.0    Protein, UA 30 mg/dL (!) Negative mg/dL    Urobilinogen, UA 1.0 E.U./dL 0.2 E.U./dL, 1.0 E.U./dL    Nitrite, UA Positive (!) Negative    Leukocytes, UA Large (!) Negative    Bacteria, UA Many (!) None Seen hpf    RBC, UA 0-2 None Seen, 0-2 hpf    WBC, UA 25-50 (!) None Seen, 0-5 hpf    Squam Epithel, UA 0-5 None Seen, 0-5 lpf        Assessment/Plan:    1. Dysuria  Urinalysis-UC if Indicated    Culture, Urine   2. Urinary tract infection  nitrofurantoin, macrocrystal-monohydrate, (MACROBID) 100 MG capsule      Antibiotics as directed.  Good fluid intake.  Cranberry juice.  F/U if no improvement or worsening.           Emely Maldonado MD      10/8/2018    The following portions of the patient's history were reviewed and updated as appropriate: allergies, current medications, past family history, past medical history, past social history, past surgical history and problem list.

## 2021-06-20 NOTE — LETTER
Letter by Soraida Brown RN at      Author: Soraida Brown RN Service: -- Author Type: --    Filed:  Encounter Date: 7/13/2020 Status: (Other)         July 13, 2020    MAIN BOOGIE  [address]  [place] [address]        Dear  Main,    Welcome to [facility] Plus (MSC+) health program. My name is Soraida Brown RN. I am your MSC+ care coordinator.     I will call you soon to see how you are doing and determine what needs you may have. My job is to help connect you to services, complete an assessment, and develop a care plan with you. There is no charge to you for the care coordination and assessment services. Our goal is to keep you as healthy and independent as possible.     [facility] includes the benefits you may receive from Medical Assistance.    Soon, you will receive a new MSC+ member identification (ID) card from [facility]. When you receive it, please use this card along with your [place] [facility] Care Programs card and Prescription Drug Coverage Program card. When you receive, it please use this card where you get your health services. If you have Medicare, you will need to show your Medicare card when you get health services.    If you have questions, please call me at [phone]. If you reach my voice mail, leave a message and your phone number. If you are hearing impaired, please call the [place] Relay at 766 or [phone] (dtchwn-sb-fyczyt relay service).    Sincerely,        Soraida Brown RN    E-mail: [email or web address]  Phone: [phone]    Care Manager  [facility]+ [id number] DHS Approved ([id number])  [id number] (11/18)

## 2021-06-20 NOTE — PROGRESS NOTES
ASSESSMENT  And plan  1. Need for immunization against influenza    - Influenza, Seasonal,Quad Inj, 36+ MOS    2. Right leg pain    She fell 3 days ago going to the bathroom.  Complaining of pain on the inferior aspect of the right knee she can flex and extend her knee without increased discomfort I am asking her to take Tylenol and use her naproxen for her pain continues I will have her return.      3. Major depressive disorder with single episode, remission status unspecified  Patient was taking the medication as needed and I warned her that she should be taking the Lexapro on a daily basis.  Worked well for no side effects have been noted    4.  Slow transit constipation    Continue polyethylene glycol no constipation no normal side effects noted    There are no Patient Instructions on file for this visit.    Orders Placed This Encounter   Procedures     Influenza, Seasonal,Quad Inj, 36+ MOS     There are no discontinued medications.    No Follow-up on file.    CHIEF COMPLAINT:  Chief Complaint   Patient presents with     Knee Pain     Right 2-3 days       HISTORY OF PRESENT ILLNESS:  Main is a 68 y.o. female     Here for evaluation of right knee pain she fell 3 days ago and going to the bathroom on her right knee she is worried that it may have been fractured again.  The same knee that she had operative repair done in [place].  She has been able to walk without pain has been able to bend and extend her knee.  She states she stopped taking her depression medicine daily because was working well and she was sleeping well felt relaxed her constipation is well controlled with the stool softener in the form of powder.    REVIEW OF SYSTEMS:     Musculoskeletal complains of right knee and referred right leg pain  Otherwise 10 point review of  All other systems are negative.    PFSH:    Reviewed    TOBACCO USE:  History   Smoking Status     Current Some Day Smoker   Smokeless Tobacco     Current User     Types: Snuff, Chew  "    Comment: betel nut w/o tobacco       VITALS:  Vitals:    09/25/18 1417   BP: 108/66   Pulse: (!) 103   Resp: 20   Temp: 97.9  F (36.6  C)   TempSrc: Oral   SpO2: 97%   Weight: 135 lb 12 oz (61.6 kg)   Height: 4' 9.5\" (1.461 m)     Wt Readings from Last 3 Encounters:   09/25/18 135 lb 12 oz (61.6 kg)   08/17/18 133 lb 4 oz (60.4 kg)   05/17/18 138 lb (62.6 kg)       PHYSICAL EXAM:    Interactive elderly appearing lady sitting in exam room in no acute distress  HEENT neck supple mucous members moist no lymph enlargement noted in the neck  Musculoskeletal system tenderness noted at the inferior aspect of the right knee.  There is no tenderness over the patella.  Neuro cranial nerves II 12 intact deep tendon reflexes are well preserved in the right knee, Lachman's and Aj's tests are negative.    DATA REVIEWED:  Additional History from Old Records Summarized (2): 0  Decision to Obtain Records (1): 0  Radiology Tests Summarized or Ordered (1): 0  Labs Reviewed or Ordered (1): 0  Medicine Test Summarized or Ordered (1): 0  Independent Review of EKG or X-RAY(2 each): 0    The visit lasted a total of 25 minutes face to face with the patient. Over 50% of the time was spent counseling and educating the patient about   Depression, knee pain and constipation    MEDICATIONS:  Current Outpatient Prescriptions   Medication Sig Dispense Refill     escitalopram oxalate (LEXAPRO) 10 MG tablet Take 1 tablet (10 mg total) by mouth daily. 30 tablet 4     naproxen (EC NAPROSYN) 500 MG EC tablet Take 1 tablet (500 mg total) by mouth 2 (two) times a day with meals. 60 tablet 2     naproxen (NAPROSYN) 500 MG tablet Take 1 tablet (500 mg total) by mouth 2 (two) times a day with meals. 60 tablet 6     polyethylene glycol (GLYCOLAX) 17 gram/dose powder Take 17 g by mouth daily. 1700 g 6     No current facility-administered medications for this visit.      Please note that this clinical encounter uses voice recognition software, there " may be typographical errors present

## 2021-06-25 ENCOUNTER — COMMUNICATION - HEALTHEAST (OUTPATIENT)
Dept: GERIATRIC MEDICINE | Facility: CLINIC | Age: 71
End: 2021-06-25

## 2021-06-25 ASSESSMENT — ACTIVITIES OF DAILY LIVING (ADL)
DEPENDENT_IADLS:: CLEANING;COOKING;LAUNDRY;SHOPPING;MEAL PREPARATION;TRANSPORTATION;MONEY MANAGEMENT;MEDICATION MANAGEMENT

## 2021-06-27 NOTE — PROGRESS NOTES
Progress Notes by Joellen Morin RN at 8/8/2019  3:03 PM     Author: Joellen Morin RN Service: -- Author Type: Registered Nurse    Filed: 8/8/2019  3:14 PM Encounter Date: 8/8/2019 Status: Signed    : Joellen Morin RN (Registered Nurse)       Clinic Care Coordination Contact    Situation: Patient chart reviewed by care coordinator.    Background:   Francisca Arevalo CHW Dah, Nadia, RN             Hi,     Patient is out of her medications but her insurance is active. The PCP is just assuming there is an insurance issue. Will you please place a CCC referral for this patient to address her lack of medications. It sounds like her meds are usually delivered by [facility] but were not delivered like usual. [place] not [place] a [place] in [place].     ThanksFrancisca    Previous Messages      ----- Message -----   From: Bree Fernández   Sent: 8/2/2019  11:48 AM   To: Nini Care Guide Pool   Subject: Insurance                                         Patient needs help with insurance please make an appt for .           Assessment:   Chart reviewed completed. Writer attempted calling patient x2 the past 3 days with both phone number listed.   Patient's insurance is active as of today.   Reported to PCP that out of all meds on 8/2/19.   Writer spoke to pharmacy and they were able to fill patient's meds today. Will be delivered tomorrow.     Plan/Recommendations:   No further assist needed from [facility]  No need to enroll with [facility].

## 2021-06-28 NOTE — PROGRESS NOTES
Progress Notes by Luc Mackey MD at 11/8/2019 11:00 AM     Author: Luc Mackey MD Service: -- Author Type: Physician    Filed: 11/8/2019  5:08 PM Encounter Date: 11/8/2019 Status: Signed    : Luc Mackey MD (Physician)       FEMALE PREVENTATIVE EXAM    Assessment and Plan:       1. Need for immunization against influenza    - Influenza High Dose, Seasonal 65+ yrs    2. Major depressive disorder with single episode, remission status unspecified    Medication is working will not change the dose refills given.  - escitalopram oxalate (LEXAPRO) 10 MG tablet; Take 1 tablet (10 mg total) by mouth daily.  Dispense: 30 tablet; Refill: 8    3. 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 Chronic right shoulder pain    - naproxen (EC NAPROSYN) 500 MG EC tablet; Take 1 tablet (500 mg total) by mouth 2 (two) times a day with meals.  Dispense: 60 tablet; Refill: 9    5. Routine general medical examination at a health care facility    Anticipatory guidance discussed lab results discussed.  Routine vaccinations given    6. KUNAL (stress urinary incontinence, female)    Based on the symptoms presented today she does have stress urinary incontinence however she did not want to try a medication.        Next follow up:  Return in about 4 months (around 3/8/2020) for Recheck.    Immunization Review  Adult Imm Review: Due today, orders placed  BMI: 28    I discussed the following with the patient:   Adult Healthy Living: Importance of regular exercise  Healthy nutrition  Getting adequate sleep  Stress management  Use of seat belts    Subjective:   Chief Complaint: Main Morris is an 69 y.o. female with chronic right shoulder pain, chronic right leg pain related to previous orthopedic injuries, major depression, and slow transit constipation, here for a  preventative health visit.     HPI:  Mu reports to be doing well overall. She is unsure how many hours of sleep she gets per night. Her chronic right shoulder pain remains unchanged which flares with range of motion.  The patient continues on naproxen and Tylenol for pain management.    Her depression has been well-controlled on Lexapro. Her PHQ-9 score today is 2, stating things are not difficult at all.     The patient notes occasional issues with urinary urgency.     Her flu shot is due today. She declines colon cancer screening.    No visual changes, hearing loss, sinus symptoms, dysphagia, sore throat, cough, shortness of breath, wheezing, chest pain/pressure/discomfort/heaviness, reflux, nausea, vomiting, abdominal pain, constipation, diarrhea, blood in stools or urine, dysuria, other urinary issues, groin pain, back/neck pain, other muscle/joint pain, numbness, weakness, headaches, dizziness, lightheadedness, rash, skin lesions, other skin changes, mood changes, depression, anxiety, or difficulty sleeping.     Healthy Habits  Are you taking a daily aspirin? No  Do you typically exercising at least 40 min, 3-4 times per week?  NO  Do you usually eat at least 4 servings of fruit and vegetables a day, include whole grains and fiber and avoid regularly eating high fat foods? Yes  Have you had an eye exam in the past two years? NO  Do you see a dentist twice per year? NO  Do you have any concerns regarding sleep? No    Safety Screen  If you own firearms, are they secured in a locked gun cabinet or with trigger locks? The patient does not own any firearms  Do you feel you are safe where you are living?: Yes (11/8/2019 11:05 AM)  Do you feel you are safe in your relationship(s)?: Yes (11/8/2019 11:05 AM)      Review of Systems:  Please see above.  The rest of the review of systems are negative for all systems.      Pap History:   Not indicated past age 65  Cancer Screening       Status Date      COLOGUARD Overdue  "5/15/2002     MAMMOGRAM Next Due 4/26/2020      Done 4/26/2018 MAMMO DIAGNOSTIC LEFT     Patient has more history with this topic...          Patient Care Team:  Luc Mackey MD as PCP - General (Family Medicine)  Soraida Brown RN as Lead Care Coordinator (Primary Care - CC)  Luc Mackey MD as Assigned PCP        History     Reviewed By Date/Time Sections Reviewed    Demi Louise, MA 11/8/2019 11:05 AM Tobacco            Objective:   Vital Signs:   Visit Vitals  /78   Pulse 74   Temp 98.3  F (36.8  C) (Oral)   Resp 18   Ht 4' 9.5\" (1.461 m)   Wt 132 lb 5 oz (60 kg)   SpO2 99%   BMI 28.14 kg/m           PHYSICAL EXAM  General: Alert, cooperative, no distress, appears stated age.  Head: Normocephalic, without obvious abnormality, atraumatic.  Eyes: PERRL, possible cataract on the left, conjunctiva/cornea otherwise clear, EOM's intact, fundi benign, both eyes.  Ears: Normal TM's and external ear canals, both ears.  Nose: Nares normal, septum midline, mucosa normal, no drainage or sinus tenderness.  Throat: Lips, mucosa, and tongue normal; teeth and gums normal.  Back: Symmetric, no curvature.  Lungs: Clear to auscultation bilaterally, respirations unlabored.  Heart: Regular rate and rhythm, S1 and S2 normal, no murmur, rub, or gallop.  Abdomen: Soft, non tender, bowel sounds active all four quadrants, no masses, no organomegaly.  Musculoskeletal: External rotation of right shoulder limited to 20 degrees abduction limited to 30 degrees due to pain. Tender to palpation over the medial aspect of the right deltoid.   Neurologic:  A & O x 3.  No tremor, no focal findings. Unable to assess strength in right upper extremity due to right shoulder pain. Strength testing is otherwise normal and symetric both proximally and distally in remaining BUE and BLE.  Uses cane for assistance with ambulation.    The 10-year ASCVD risk score ([place] SHAILA Jr., et al., 2013) is: 12.2%    Values used to calculate the score:      Age: " 69 years      Sex: Female      Is Non- : No      Diabetic: No      Tobacco smoker: Yes      Systolic Blood Pressure: 120 mmHg      Is BP treated: No      HDL Cholesterol: 44 mg/dL      Total Cholesterol: 239 mg/dL         Medication List          Accurate as of November 8, 2019 11:40 AM. If you have any questions, ask your nurse or doctor.            CONTINUE taking these medications    acetaminophen 325 MG tablet  Also known as:  TYLENOL  INSTRUCTIONS:  Take 2 tablets (650 mg total) by mouth 3 (three) times a day.        escitalopram oxalate 10 MG tablet  Also known as:  LEXAPRO  INSTRUCTIONS:  Take 1 tablet (10 mg total) by mouth daily.  Doctor's comments:  Please refill monthly        naproxen 500 MG EC tablet  Also known as:  EC NAPROSYN  INSTRUCTIONS:  Take 1 tablet (500 mg total) by mouth 2 (two) times a day with meals.        polyethylene glycol 17 gram/dose powder  Also known as:  GLYCOLAX  INSTRUCTIONS:  Take 17 g by mouth daily.              Where to Get Your Medications      These medications were sent to [facility] - [place], [place] - [address] [place] [address]  [address], [place] [address]    Phone:  [phone]     escitalopram oxalate 10 MG tablet    naproxen 500 MG EC tablet         Additional Screenings Completed Today:   No labs ordered today.       I, Hanna Núñez, am scribing for and in the presence of, Dr. Mackey.    I, Dr. Mackey, personally performed the services described in this documentation, as scribed by Hanna Núñez in my presence, and it is both accurate and complete.     Please note that this clinical encounter uses voice recognition software, there may be typographical errors present.

## 2021-07-01 ENCOUNTER — COMMUNICATION - HEALTHEAST (OUTPATIENT)
Dept: FAMILY MEDICINE | Facility: CLINIC | Age: 71
End: 2021-07-01

## 2021-07-01 DIAGNOSIS — G89.29 CHRONIC RIGHT SHOULDER PAIN: ICD-10-CM

## 2021-07-01 DIAGNOSIS — M25.511 CHRONIC RIGHT SHOULDER PAIN: ICD-10-CM

## 2021-07-01 RX ORDER — NAPROXEN 500 MG/1
TABLET, DELAYED RELEASE ORAL
Qty: 60 TABLET | Refills: 9 | Status: SHIPPED | OUTPATIENT
Start: 2021-07-01 | End: 2022-06-08

## 2021-07-02 ENCOUNTER — PATIENT OUTREACH (OUTPATIENT)
Dept: GERIATRIC MEDICINE | Facility: CLINIC | Age: 71
End: 2021-07-02

## 2021-07-02 NOTE — LETTER
July 2, 2021    MAIN BOOGIE  [address]  [place] [address]        Dear Main:    At [facility], we are dedicated to improving your health and well-being. Enclosed is the Comprehensive Care Plan that we developed with you on 6/25/21. Please review the Care Plan carefully.    As a reminder, some of the things we discussed at your visit include:    Your physical and mental health    Ways to reduce falls    Health care needs you may have    Don t forget to contact your care coordinator if you:    Have been hospitalized or plan to be hospitalized     Have had a fall     Have experienced a change in physical health    Are experiencing emotional problems     If you do not agree with your Care Plan, have questions about it, or have experienced a change in your needs, please call me at  . If you are hearing impaired, please call the [place] Relay at 061 or [phone] (ioimxl-ca-ooywur relay service).    Sincerely,        Soraida Brwon RN  [phone]  [email or web address]    [facility]+[id number] [place] ([id number])     [id number] (11/18)

## 2021-07-02 NOTE — LETTER
[facility]  [address]  [place] [address]  Phone:  [phone]  Fax:  [phone]      August 2, 2021    MAIN BOOGIE  [address]  [place] [address]    Dear Main,    Enclosed is a copy of your completed PCA Assessment and Service Agreement. Please remember to notify your care coordinator { Name:[phone]}, at   once you have chosen a PCA Agency.  If you have additional questions regarding your assessment please contact your care coordinator. If you feel that your needs are not being met, please contact the Clinical Supervisor at [phone].    Sincerely,      ***  Case Management Specialist  [facility]    Enclosure:  Completed PCA assessment

## 2021-07-04 NOTE — TELEPHONE ENCOUNTER
Regarding: FW: Other  Contact: [phone]      ----- Message -----  From: David Simms  Sent: 2/7/2019   4:54 PM  To: ARIEL Menon Nurse Msg Pool  Subject: Other                                            ----- Message from Patient Portal,  sent at 2/7/2019  4:54 PM CST -----    hello      i was wondering if you could pre-authorize my insurance company-[employer](on file) for a new C-PAP machine.  my original resmed S9 has got to be beyond the 5 year requirement with the exception that i do not recall the year they replaced my motor as it malfunctioned while it was still under warranty.  it has got to be very close to 5 years ago.  in any event, the chip doesn't seem to work.  when i insert the chip, the machine will NOT go on.  when i remove the chip, it will allow me to power up.   please let me know if you are able to pre-authorize this or if i need to contact my insurance company.    thank you-randy simms   Telephone Encounter by Gracie Alegre RN at 7/1/2021  9:23 AM     Author: Gracie Alegre RN Service: -- Author Type: Registered Nurse    Filed: 7/1/2021  9:23 AM Encounter Date: 7/1/2021 Status: Signed    : Gracie Alegre RN (Registered Nurse)       RN cannot approve Refill Request    RN can NOT refill this medication med is not covered by policy/route to provider. Last office visit: 12/8/2020 Luc Mackey MD Last Physical: 11/8/2019 Last MTM visit: Visit date not found Last visit same specialty: 12/8/2020 Luc Mackey MD.  Next visit within 3 mo: Visit date not found  Next physical within 3 mo: Visit date not found      Sandy Li Connection Triage/Med Refill 7/1/2021    Requested Prescriptions   Pending Prescriptions Disp Refills   ? EC-NAPROXEN 500 mg EC tablet [Pharmacy Med Name: NAPROXEN  MG TBEC 500 Tablet] 60 tablet 9     Sig: TAKE 1 TABLET (500 MG TOTAL) BY MOUTH 2 (TWO) TIMES A DAY WITH MEALS.       There is no refill protocol information for this order

## 2021-07-07 NOTE — PROGRESS NOTES
[facility] Care Coordination  [facility] Home Visit Assessment     Annual Health Risk Assessment with Main Morris completed on 6/25/2021 via phone due to covid-19 restrictions.    Type of residence:: Apartment  Current living arrangement:: I live in a private home with family, I live in a private home with spouse     Assessment completed with: Patient, Other(Language Line) Deisi MCKEON    Current Care Plan  Member currently receiving the following home care services: no   Member currently receiving the following community resources: PCA    Medication Review  Medication reconciliation completed in Epic: IF NO, PLEASE EXPLAIN due to phone assessment  Medication set-up & administration: Family/informal caregiver sets up weekly  Family caregiver administers medications  Medication Risk Assessment Medication (1 or more, place referral to MTM):  N/A: No risk factors identified  MTM Referral Placed: No: No risk factors idenified    Mental/Behavioral Health   Depression Screening:    PHQ-2 Total Score: 0       Mental health DX:: Yes   Mental health DX how managed:: Medication    Falls Assessment:   Fallen 2 or more times in the past year?: No   Any fall with injury in the past year?: No    ADL/IADL Dependencies:   Dependent ADLs:: Ambulation-walker, Toileting, Bathing, Transfers  Dependent IADLs:: Cleaning, Cooking, Laundry, Shopping, Meal Preparation, Transportation, Money Management, Medication Management    [facility] Plan sponsored benefits: Shared information re: Silver Sneakers/gym memberships, ASA, Calcium +D.    PCA Assessment completed at visit: Yes Annual PCA assessment indicated 16 units per day of PCA. This is the same as the previous assessment.     Elderly Waiver Eligibility: Yes, but member declines EW service; will not open to EW    Care Plan & Recommendations: continue to decrease use of betel nut.  Has received both does of covid vaccine.    See CC for detailed assessment  information.    Follow-Up Plan: Member informed of future contact, plan to f/u with member with a 6 month telephone assessment.  Contact information shared with member and family, encouraged member to call with any questions or concerns at any time.    Soraida Brown RN  [facility]  [phone]

## 2021-07-23 ENCOUNTER — PATIENT OUTREACH (OUTPATIENT)
Dept: GERIATRIC MEDICINE | Facility: CLINIC | Age: 71
End: 2021-07-23

## 2021-07-23 NOTE — PROGRESS NOTES
[facility] Care Coordination Contact    Internal CC change effective 8/1/2021.  Mailed member CC Change letter.  Additional tasks to be completed by CMS include: update database & EPIC, enter CC Change in MMIS, and move member files on Q drive.  Betina Brink  Case Management Specialist  [facility]  [phone]

## 2021-07-23 NOTE — LETTER
July 23, 2021    MAIN BOOGIE  [address]  [place] [address]      Dear Main:    As a member of [facility] ([facility] you are provided a care coordinator. I will be your new care coordinator as of 8/1/2021. I will be calling you soon to see how you are doing and determine your needs.    If you have any questions, please feel free to call me at  [phone]. If you reach my voice mail, please leave a message and your phone number. If you are hearing impaired, please call the [place] Relay at [phone] or [phone] (euccxl-yr-zzwsgf relay service).    I look forward to speaking with you soon.    Sincerely,      PHILLIP Swain          [facility]  [id number] DHS Approved ([id number])  [id number] (11/18)

## 2021-08-02 NOTE — PROGRESS NOTES
[facility] Care Coordination Contact    Received after visit chart from care coordinator.  Completed following tasks: Mailed copy of care plan to client    [facility]:  Emailed completed PCA assessment to [facility].  Faxed copy of PCA assessment to PCA Agency and mailed copy to member.  Faxed MD Communication to PCP.     Mailed POC sig and PCA sig to member to sign and return asap.    Josefa Arthur  [facility]  Case Management Specialist  [phone]

## 2021-08-17 NOTE — PROGRESS NOTES
[facility] Care Coordination Contact    Faxed signed pca sig page to [facility] and provider.     Josefa Arthur  Care Management Specialist  [facility]  [phone]

## 2022-01-04 ENCOUNTER — PATIENT OUTREACH (OUTPATIENT)
Dept: GERIATRIC MEDICINE | Facility: CLINIC | Age: 72
End: 2022-01-04
Payer: COMMERCIAL

## 2022-01-04 NOTE — PROGRESS NOTES
[facility] Care Coordination Contact      [facility] Six-Month Telephone Assessment    6 month telephone assessment completed on 1/4/2022.    ER visits: No  Hospitalizations: No  TCU stays: No  Significant health status changes: No changes to report. Member reports health has been stable the past six months.   Falls/Injuries: No  ADL/IADL changes: No  Changes in services: No    Caregiver Assessment follow up:  NA    Goals: See POC in chart for goal progress documentation.  Member would like to continue with current goals. No concerns to report at this time. No further services or equipment needs requested.     Will see member in 6 months for an annual health risk assessment.   Encouraged member to call CC with any questions or concerns in the meantime.   PHILLIP Swain  [facility]  Cell: [phone]

## 2022-05-03 ENCOUNTER — PATIENT OUTREACH (OUTPATIENT)
Dept: GERIATRIC MEDICINE | Facility: CLINIC | Age: 72
End: 2022-05-03
Payer: COMMERCIAL

## 2022-05-03 NOTE — PROGRESS NOTES
[facility] Care Coordination Contact    Called  Criselda Garcia to schedule annual HRA home visit. Left a message requesting a return call to schedule HRA.PHILLIP Swain  [facility]  Cell: [phone]

## 2022-05-04 ENCOUNTER — PATIENT OUTREACH (OUTPATIENT)
Dept: GERIATRIC MEDICINE | Facility: CLINIC | Age: 72
End: 2022-05-04
Payer: COMMERCIAL

## 2022-05-04 NOTE — PROGRESS NOTES
[facility] Care Coordination Contact    [facility] Home Visit Assessment     Home visit for Health Risk Assessment with Main Morris completed on May 4, 2022       Assessment completed with: Patient, Other(Language Line) Deisi MCKEON     Current Care Plan  Member currently receiving the following home care services: no   Member currently receiving the following community resources: PCA     Medication Review  Medication reconciliation completed in Epic: Yes-reviewed  Medication set-up & administration: Family/informal caregiver sets up weekly  Family caregiver administers medications  Medication Risk Assessment Medication (1 or more, place referral to MTM):  N/A: No risk factors identified  MTM Referral Placed: No: No risk factors idenified     Mental/Behavioral Health   Depression Screening:    PHQ-2 Total Score: 0        Mental health DX:: Yes   Mental health DX how managed:: Medication     Falls Assessment:   Fallen 2 or more times in the past year?: No   Any fall with injury in the past year?: No     ADL/IADL Dependencies:   Dependent ADLs:: Ambulation-walker, Toileting, Bathing, Transfers  Dependent IADLs:: Cleaning, Cooking, Laundry, Shopping, Meal Preparation, Transportation, Money Management, Medication Management     [facility] Plan sponsored benefits: Shared information re: Silver Sneakers/gym memberships, ASA, Calcium +D.     PCA Assessment completed at visit: Yes Annual PCA assessment indicated 16 units per day of PCA. This is the same as the previous assessment.      Elderly Waiver Eligibility: Yes, but member declines EW service; will not open to EW     Care Plan & Recommendations: Member will remain living in the community with her family and receive daily PCA services. Family providers informal supports. Member will continue to work on decreasing the use of betel nut. No further services or equipment needs requested at this time.        See [facility] for detailed assessment information.    Follow-Up  Plan: Member informed of future contact, plan to f/u with member with a 6 month telephone assessment.  Contact information shared with member and family, encouraged member to call with any questions or concerns at any time.    [facility] care continuum providers: Please see Snapshot and Care Management Flowsheets for Specific details of care plan.    This CC note routed to PCP.  PHILLIP Swain  [facility]  Cell: [phone]

## 2022-05-10 ENCOUNTER — PATIENT OUTREACH (OUTPATIENT)
Dept: GERIATRIC MEDICINE | Facility: CLINIC | Age: 72
End: 2022-05-10
Payer: COMMERCIAL

## 2022-05-10 NOTE — PROGRESS NOTES
[facility] Care Coordination Contact    Received after visit chart from care coordinator.  Completed following tasks: Mailed copy of care plan to client, Updated services in Database, Mailed copy of POC signature sheet for member to sign and return in SASE  and Mailed are Safe Medication Disposal   , Provider Signature - No POC Shared:  Member indicates that they do not want their POC shared with any EW providers.  [facility]:  Emailed completed PCA assessment to [facility].  Faxed copy of PCA assessment to PCA Agency and mailed copy to member.  Faxed MD Communication to PCP. Mailed PCA sig page to member to sign and return in SASE.     Awa Del Rio  Care Management Specialist   [facility]   [phone]

## 2022-05-10 NOTE — LETTER
May 10, 2022    MIAN BOOGIE  [address]  [place] [address]        Dear Main:    At [facility], we are dedicated to improving your health and well-being. Enclosed is the Comprehensive Care Plan that we developed with you on 5/4/2022. Please review the Care Plan carefully.    As a reminder, some of the things we discussed at your visit include:    Your physical and mental health    Ways to reduce falls    Health care needs you may have    Don t forget to contact your care coordinator if you:    Have been hospitalized or plan to be hospitalized     Have had a fall     Have experienced a change in physical health    Are experiencing emotional problems     If you do not agree with your Care Plan, have questions about it, or have experienced a change in your needs, please call me at [phone]. If you are hearing impaired, please call the [place] Relay at 564 or [phone] (imiohc-oc-xktjbb relay service).    Sincerely,        PHILLIP Swain    E-mail: [email or web address]  Phone: [phone]      [facility]+[id number] IA ([id number])     [id number] (11/18)

## 2022-06-07 ASSESSMENT — ACTIVITIES OF DAILY LIVING (ADL)
CURRENT_FUNCTION: TRANSPORTATION REQUIRES ASSISTANCE
CURRENT_FUNCTION: TELEPHONE REQUIRES ASSISTANCE

## 2022-06-07 ASSESSMENT — ENCOUNTER SYMPTOMS
HEADACHES: 0
CONSTIPATION: 0
ABDOMINAL PAIN: 0
MYALGIAS: 0
DIZZINESS: 0
DYSURIA: 0
JOINT SWELLING: 0
WEAKNESS: 0
DIARRHEA: 0
FEVER: 0
HEARTBURN: 0
HEMATURIA: 0
PALPITATIONS: 0
ARTHRALGIAS: 0
BREAST MASS: 0
SHORTNESS OF BREATH: 0
CHILLS: 0
COUGH: 0
EYE PAIN: 0
NAUSEA: 0
PARESTHESIAS: 0
FREQUENCY: 0
HEMATOCHEZIA: 0
SORE THROAT: 0
NERVOUS/ANXIOUS: 0

## 2022-06-08 ENCOUNTER — OFFICE VISIT (OUTPATIENT)
Dept: FAMILY MEDICINE | Facility: CLINIC | Age: 72
End: 2022-06-08
Payer: COMMERCIAL

## 2022-06-08 VITALS
DIASTOLIC BLOOD PRESSURE: 70 MMHG | RESPIRATION RATE: 20 BRPM | TEMPERATURE: 97.7 F | OXYGEN SATURATION: 97 % | SYSTOLIC BLOOD PRESSURE: 122 MMHG | HEART RATE: 100 BPM | HEIGHT: 58 IN | BODY MASS INDEX: 28.13 KG/M2 | WEIGHT: 134 LBS

## 2022-06-08 DIAGNOSIS — Z00.00 ROUTINE HISTORY AND PHYSICAL EXAMINATION OF ADULT: Primary | ICD-10-CM

## 2022-06-08 DIAGNOSIS — F32.9 MAJOR DEPRESSIVE DISORDER WITH SINGLE EPISODE, REMISSION STATUS UNSPECIFIED: ICD-10-CM

## 2022-06-08 DIAGNOSIS — K59.01 SLOW TRANSIT CONSTIPATION: ICD-10-CM

## 2022-06-08 DIAGNOSIS — R07.89 CHEST WALL PAIN: ICD-10-CM

## 2022-06-08 DIAGNOSIS — M25.511 CHRONIC RIGHT SHOULDER PAIN: ICD-10-CM

## 2022-06-08 DIAGNOSIS — G89.29 CHRONIC RIGHT SHOULDER PAIN: ICD-10-CM

## 2022-06-08 LAB
ALBUMIN SERPL-MCNC: 3.8 G/DL (ref 3.5–5)
ALP SERPL-CCNC: 102 U/L (ref 45–120)
ALT SERPL W P-5'-P-CCNC: 21 U/L (ref 0–45)
ANION GAP SERPL CALCULATED.3IONS-SCNC: 9 MMOL/L (ref 5–18)
AST SERPL W P-5'-P-CCNC: 20 U/L (ref 0–40)
BILIRUB SERPL-MCNC: 0.8 MG/DL (ref 0–1)
BUN SERPL-MCNC: 10 MG/DL (ref 8–28)
CALCIUM SERPL-MCNC: 9.3 MG/DL (ref 8.5–10.5)
CHLORIDE BLD-SCNC: 108 MMOL/L (ref 98–107)
CHOLEST SERPL-MCNC: 276 MG/DL
CO2 SERPL-SCNC: 26 MMOL/L (ref 22–31)
CREAT SERPL-MCNC: 0.68 MG/DL (ref 0.6–1.1)
FASTING STATUS PATIENT QL REPORTED: ABNORMAL
GFR SERPL CREATININE-BSD FRML MDRD: >90 ML/MIN/1.73M2
GLUCOSE BLD-MCNC: 91 MG/DL (ref 70–125)
HDLC SERPL-MCNC: 46 MG/DL
LDLC SERPL CALC-MCNC: 195 MG/DL
POTASSIUM BLD-SCNC: 4.1 MMOL/L (ref 3.5–5)
PROT SERPL-MCNC: 7.4 G/DL (ref 6–8)
SODIUM SERPL-SCNC: 143 MMOL/L (ref 136–145)
TRIGL SERPL-MCNC: 173 MG/DL

## 2022-06-08 PROCEDURE — 80061 LIPID PANEL: CPT | Performed by: FAMILY MEDICINE

## 2022-06-08 PROCEDURE — 99397 PER PM REEVAL EST PAT 65+ YR: CPT | Performed by: FAMILY MEDICINE

## 2022-06-08 PROCEDURE — 80053 COMPREHEN METABOLIC PANEL: CPT | Performed by: FAMILY MEDICINE

## 2022-06-08 PROCEDURE — 36415 COLL VENOUS BLD VENIPUNCTURE: CPT | Performed by: FAMILY MEDICINE

## 2022-06-08 RX ORDER — NAPROXEN 500 MG/1
TABLET ORAL
Qty: 60 TABLET | Refills: 11 | Status: SHIPPED | OUTPATIENT
Start: 2022-06-08 | End: 2023-10-31

## 2022-06-08 RX ORDER — POLYETHYLENE GLYCOL 3350 17 G/17G
17 POWDER, FOR SOLUTION ORAL DAILY
Qty: 1700 G | Refills: 6 | Status: SHIPPED | OUTPATIENT
Start: 2022-06-08 | End: 2022-08-23

## 2022-06-08 RX ORDER — ESCITALOPRAM OXALATE 10 MG/1
10 TABLET ORAL DAILY
Qty: 30 TABLET | Refills: 8 | Status: SHIPPED | OUTPATIENT
Start: 2022-06-08 | End: 2022-08-23

## 2022-06-08 NOTE — PROGRESS NOTES
SUBJECTIVE:   Main Morris is a 72 year old female who presents for Preventive Visit.      Patient has been advised of split billing requirements and indicates understanding: Yes  Are you in the first 12 months of your Medicare coverage?  No    HPI  Do you feel safe in your environment? Yes    Have you ever done Advance Care Planning? (For example, a Health Directive, POLST, or a discussion with a medical provider or your loved ones about your wishes): No, advance care planning information given to patient to review.  Patient plans to discuss their wishes with loved ones or provider.         Fall risk  Fallen 2 or more times in the past year?: No  Any fall with injury in the past year?: No  click delete button to remove this line now  Cognitive Screening   1) Repeat 3 items (Leader, Season, Table)    2) Clock draw: Unable to complete (cultural barrier)  3) 3 item recall: Recalls 2 objects   Results: 0 items recalled: PROBABLE COGNITIVE IMPAIRMENT, **INFORM PROVIDER**    Mini-CogTM Copyright [employer]. Licensed by the author for use in [facility]; reprinted with permission ([email or web address]). All rights reserved.      Do you have sleep apnea, excessive snoring or daytime drowsiness?: no    Reviewed and updated as needed this visit by clinical staff   Tobacco  Allergies  Meds                Reviewed and updated as needed this visit by Provider                   Social History     Tobacco Use     Smoking status: Former Smoker     Smokeless tobacco: Former User     Types: Chew   Substance Use Topics     Alcohol use: No     If you drink alcohol do you typically have >3 drinks per day or >7 drinks per week? Not applicable    Alcohol Use 6/7/2022   Prescreen: >3 drinks/day or >7 drinks/week? No             Current providers sharing in care for this patient include:   Patient Care Team:  Luc Mackey MD as PCP - General (Family Practice)  Luc Mackey MD as Assigned PCP  Brittny Jara BSW as Lead Care  "Coordinator    The following health maintenance items are reviewed in Epic and correct as of today:  Health Maintenance Due   Topic Date Due     DEXA  Never done     ANNUAL REVIEW OF HM ORDERS  Never done     ADVANCE CARE PLANNING  Never done     DEPRESSION ACTION PLAN  Never done     COLORECTAL CANCER SCREENING  Never done     HEPATITIS C SCREENING  Never done     ZOSTER IMMUNIZATION (1 of 2) Never done     LUNG CANCER SCREENING  Never done     MAMMO SCREENING  04/26/2020     MEDICARE ANNUAL WELLNESS VISIT  11/08/2020     Lab work is in process          Review of Systems  Muscular skeletal positive for right-sided chest wall pain  Other 10 point review of systems negative    OBJECTIVE:   /70 (BP Location: Left arm, Patient Position: Sitting, Cuff Size: Adult Regular)   Pulse 100   Temp 97.7  F (36.5  C) (Oral)   Resp 20   Ht 1.473 m (4' 10\")   Wt 60.8 kg (134 lb)   SpO2 97%   BMI 28.01 kg/m   Estimated body mass index is 28.01 kg/m  as calculated from the following:    Height as of this encounter: 1.473 m (4' 10\").    Weight as of this encounter: 60.8 kg (134 lb).  Physical Exam  GENERAL: healthy, alert and no distress  EYES: Eyes grossly normal to inspection, PERRL and conjunctivae and sclerae normal  HENT: ear canals and TM's normal, nose and mouth without ulcers or lesions  NECK: no adenopathy, no asymmetry, masses, or scars and thyroid normal to palpation  RESP: lungs clear to auscultation - no rales, rhonchi or wheezes  BREAST: normal without masses, tenderness or nipple discharge and no palpable axillary masses or adenopathy  CV: regular rate and rhythm, normal S1 S2, no S3 or S4, no murmur, click or rub, no peripheral edema and peripheral pulses strong  ABDOMEN: soft, nontender, no hepatosplenomegaly, no masses and bowel sounds normal  MS: no gross musculoskeletal defects noted, no edema  SKIN: no suspicious lesions or rashes  NEURO: Normal strength and tone, mentation intact and speech " "normal  PSYCH: mentation appears normal, affect normal/bright    Diagnostic Test Results:  Labs reviewed in Epic    ASSESSMENT / PLAN:       ICD-10-CM    1. Routine history and physical examination of adult  Z00.00 Comprehensive metabolic panel (BMP + Alb, Alk Phos, ALT, AST, Total. Bili, TP)     Lipid panel   2. Chest wall pain patient fell 2 weeks ago and right-sided chest wall no accessory muscle respiration use no admission tissue his lung sounds naproxen trial will return for x-ray if symptoms do not improve R07.89 XR Chest 2 Views   3. Chronic right shoulder pain  M25.511 naproxen (EC-NAPROXEN) 500 MG EC tablet    G89.29    4. Slow transit constipation  K59.01 polyethylene glycol (MIRALAX) 17 GM/Dose powder   5. Major depressive disorder with single episode, remission status unspecified  F32.9 escitalopram (LEXAPRO) 10 MG tablet       Patient has been advised of split billing requirements and indicates understanding: Yes    COUNSELING:  Reviewed preventive health counseling, as reflected in patient instructions       Regular exercise       Healthy diet/nutrition    Estimated body mass index is 28.01 kg/m  as calculated from the following:    Height as of this encounter: 1.473 m (4' 10\").    Weight as of this encounter: 60.8 kg (134 lb).        She reports that she has quit smoking. She has quit using smokeless tobacco.  Her smokeless tobacco use included chew.      Appropriate preventive services were discussed with this patient, including applicable screening as appropriate for cardiovascular disease, diabetes, osteopenia/osteoporosis, and glaucoma.  As appropriate for age/gender, discussed screening for colorectal cancer, prostate cancer, breast cancer, and cervical cancer. Checklist reviewing preventive services available has been given to the patient.    Reviewed patients plan of care and provided an AVS. The Basic Care Plan (routine screening as documented in Health Maintenance) for Mu meets the Care Plan " requirement. This Care Plan has been established and reviewed with the Patient.    Counseling Resources:  ATP IV Guidelines  Pooled Cohorts Equation Calculator  Breast Cancer Risk Calculator  Breast Cancer: Medication to Reduce Risk  FRAX Risk Assessment  ICSI Preventive Guidelines  Dietary Guidelines for Americans, 2010  USDA's MyPlate  ASA Prophylaxis  Lung CA Screening    Luc Mackey MD  [facility]    Identified Health Risks:

## 2022-08-23 ENCOUNTER — PATIENT OUTREACH (OUTPATIENT)
Dept: GERIATRIC MEDICINE | Facility: CLINIC | Age: 72
End: 2022-08-23

## 2022-08-23 ENCOUNTER — TELEPHONE (OUTPATIENT)
Dept: FAMILY MEDICINE | Facility: CLINIC | Age: 72
End: 2022-08-23

## 2022-08-23 ENCOUNTER — OFFICE VISIT (OUTPATIENT)
Dept: FAMILY MEDICINE | Facility: CLINIC | Age: 72
End: 2022-08-23
Payer: COMMERCIAL

## 2022-08-23 VITALS
WEIGHT: 131 LBS | RESPIRATION RATE: 18 BRPM | BODY MASS INDEX: 27.5 KG/M2 | SYSTOLIC BLOOD PRESSURE: 108 MMHG | TEMPERATURE: 98.3 F | OXYGEN SATURATION: 96 % | DIASTOLIC BLOOD PRESSURE: 62 MMHG | HEART RATE: 83 BPM | HEIGHT: 58 IN

## 2022-08-23 DIAGNOSIS — Z12.31 VISIT FOR SCREENING MAMMOGRAM: ICD-10-CM

## 2022-08-23 DIAGNOSIS — M25.511 CHRONIC RIGHT SHOULDER PAIN: ICD-10-CM

## 2022-08-23 DIAGNOSIS — F32.9 MAJOR DEPRESSIVE DISORDER WITH SINGLE EPISODE, REMISSION STATUS UNSPECIFIED: ICD-10-CM

## 2022-08-23 DIAGNOSIS — Z11.59 NEED FOR HEPATITIS C SCREENING TEST: ICD-10-CM

## 2022-08-23 DIAGNOSIS — K59.01 SLOW TRANSIT CONSTIPATION: ICD-10-CM

## 2022-08-23 DIAGNOSIS — G89.29 CHRONIC RIGHT SHOULDER PAIN: ICD-10-CM

## 2022-08-23 DIAGNOSIS — Z12.11 SCREEN FOR COLON CANCER: ICD-10-CM

## 2022-08-23 PROCEDURE — 99214 OFFICE O/P EST MOD 30 MIN: CPT | Mod: 25 | Performed by: FAMILY MEDICINE

## 2022-08-23 PROCEDURE — 91305 COVID-19,PF,PFIZER (12+ YRS): CPT | Performed by: FAMILY MEDICINE

## 2022-08-23 PROCEDURE — 0054A COVID-19,PF,PFIZER (12+ YRS): CPT | Performed by: FAMILY MEDICINE

## 2022-08-23 RX ORDER — POLYETHYLENE GLYCOL 3350 17 G/17G
17 POWDER, FOR SOLUTION ORAL DAILY
Qty: 1700 G | Refills: 6 | Status: SHIPPED | OUTPATIENT
Start: 2022-08-23 | End: 2023-06-30

## 2022-08-23 RX ORDER — ESCITALOPRAM OXALATE 10 MG/1
10 TABLET ORAL DAILY
Qty: 30 TABLET | Refills: 8 | Status: SHIPPED | OUTPATIENT
Start: 2022-08-23 | End: 2023-03-15

## 2022-08-23 RX ORDER — NAPROXEN 500 MG/1
500 TABLET ORAL 2 TIMES DAILY WITH MEALS
Qty: 60 TABLET | Refills: 9 | Status: SHIPPED | OUTPATIENT
Start: 2022-08-23 | End: 2024-02-28

## 2022-08-23 ASSESSMENT — PATIENT HEALTH QUESTIONNAIRE - PHQ9
SUM OF ALL RESPONSES TO PHQ QUESTIONS 1-9: 11
10. IF YOU CHECKED OFF ANY PROBLEMS, HOW DIFFICULT HAVE THESE PROBLEMS MADE IT FOR YOU TO DO YOUR WORK, TAKE CARE OF THINGS AT HOME, OR GET ALONG WITH OTHER PEOPLE: SOMEWHAT DIFFICULT
SUM OF ALL RESPONSES TO PHQ QUESTIONS 1-9: 11

## 2022-08-23 NOTE — TELEPHONE ENCOUNTER
Author contacted the patient and notified PharmD that patient would like her medications as ordered by Dr. Mackey today to be delivered to her home address. PharmD agrees. Medications due to arrive at patient's home tomorrow.

## 2022-08-23 NOTE — PROGRESS NOTES
CC updated program tasks and targets for [facility] launch.  PHILLIP Swain  [facility]  Cell: [phone]

## 2022-08-23 NOTE — PROGRESS NOTES
"  Assessment & Plan     Screen for colon cancer    Since states she does not want a colonoscopy will think about Cologuard at the next visit.    Need for hepatitis C screening test    Patient will have the test done at the next visit    Visit for screening mammogram    She has not interested in the mammogram she states she will get that at her next visit here.  - MA SCREENING DIGITAL BILAT - Future  (s+30); Future    Slow transit constipation    Requires medication states that she has a bowel movement daily with the medication  - polyethylene glycol (MIRALAX) 17 GM/Dose powder; Take 17 g by mouth daily    Major depressive disorder with single episode, remission status unspecified    No nightmares noted sleeping well.  - escitalopram (LEXAPRO) 10 MG tablet; Take 1 tablet (10 mg) by mouth daily  - Primary Care - Care Coordination Referral; Future    Chronic right shoulder pain    Continues to have chronic shoulder pain Naprosyn helps with the symptoms however she cannot do any housework, self-cares or kitchen work with any use of right upper extremity she needs a reassessment to see if she can qualify for more PCA hours  - naproxen (EC-NAPROSYN) 500 MG EC tablet; Take 1 tablet (500 mg) by mouth 2 times daily (with meals)  - Primary Care - Care Coordination Referral; Future    [id number]}     BMI:   Estimated body mass index is 27.38 kg/m  as calculated from the following:    Height as of this encounter: 1.473 m (4' 10\").    Weight as of this encounter: 59.4 kg (131 lb).       Depression Screening Follow Up    PHQ 8/23/2022   PHQ-9 Total Score 11   Q9: Thoughts of better off dead/self-harm past 2 weeks Several days   F/U: Thoughts of suicide or self-harm No   F/U: Safety concerns No                 Follow Up    Follow Up Actions Taken      Discussed the following ways the patient can remain in a safe environment:    {  Luc Mackey MD  [facility] YESSICA Quach is a 72 year old accompanied by her " "spouse, presenting for the following health issues:  Follow Up      HPI       72-year-old female here for follow-up for chronic right shoulder pain secondary to [place].  She has had chronic right shoulder pain for many years.  She reports Naprosyn does help but she cannot use her right upper arm or move her right neck because of discomfort.  She also has constipation.  She is requesting whether more PCA services can be given to her.    Review of Systems   Constitutional, HEENT, cardiovascular, pulmonary, gi and gu systems are negative, except as otherwise noted.      Objective    /62 (BP Location: Left arm, Patient Position: Sitting, Cuff Size: Adult Regular)   Pulse 83   Temp 98.3  F (36.8  C) (Oral)   Resp 18   Ht 1.473 m (4' 10\")   Wt 59.4 kg (131 lb)   SpO2 96%   BMI 27.38 kg/m    Body mass index is 27.38 kg/m .  Physical Exam   GENERAL: healthy, alert and no distress    CV: regular rate and rhythm, normal S1 S2, no S3 or S4, no murmur, click or rub, no peripheral edema and peripheral pulses strong  MS: extremities normal- no gross deformities noted  Decreased range of motion noted in elevation and anterior adduction on the right shoulder at 30 and 20 degrees respectively tenderness elicited on the anterior portion of the right deltoid muscle.  SKIN: no suspicious lesions or rashes  NEURO: Normal strength and tone, mentation intact and speech normal  PSYCH: mentation appears normal, affect normal/bright                .  ..  "

## 2022-10-20 ENCOUNTER — PATIENT OUTREACH (OUTPATIENT)
Dept: GERIATRIC MEDICINE | Facility: CLINIC | Age: 72
End: 2022-10-20

## 2022-10-20 NOTE — PROGRESS NOTES
[facility] Care Coordination Contact      [facility] Six-Month Telephone Assessment    6 month telephone assessment completed on 10/20/2022.    ER visits: No  Hospitalizations: No  TCU stays: No  Significant health status changes: No changes to report.   Falls/Injuries: No  ADL/IADL changes: No  Changes in services: No    Caregiver Assessment follow up:  NA    Goals: See POC in chart for goal progress documentation.  Goals continued.     Will see member in 6 months for an annual health risk assessment.   Encouraged member to call CC with any questions or concerns in the meantime.   PHILLIP Swain  [facility]  Cell: [phone]

## 2022-12-01 ENCOUNTER — TELEPHONE (OUTPATIENT)
Dept: MAMMOGRAPHY | Facility: CLINIC | Age: 72
End: 2022-12-01

## 2022-12-01 NOTE — TELEPHONE ENCOUNTER
Patient Quality Outreach    Patient is due for the following:   Breast Cancer Screening - Mammogram    Next Steps:   Schedule a office visit for mammogram    Type of outreach:    Spoke with patient.She seemed very confused and ended up hanging up.    Next Steps:  Reach out within 90 days via Phone.    Max number of attempts reached: No. Will try again in 90 days if patient still on fail list.    Questions for provider review:    None     GORDO Duong  Chart routed to Care Team.

## 2022-12-26 ENCOUNTER — PATIENT OUTREACH (OUTPATIENT)
Dept: GERIATRIC MEDICINE | Facility: CLINIC | Age: 72
End: 2022-12-26

## 2022-12-26 NOTE — PROGRESS NOTES
[facility] Care Coordination Contact    Internal CC change effective 1/1/2023.  Mailed member CC Change letter.  Additional tasks to be completed by CMS include: update database & EPIC, enter CC Change in MMIS, and move member files on Q drive.    Betina Brink  Case Management Specialist  [facility]  [phone]

## 2022-12-26 NOTE — LETTER
December 26, 2022    MAIN BOOGIE  [address]  [place] [address]      Dear Main:    As a member of [facility] ([facility] you are provided a care coordinator. I will be your new care coordinator as of 1/1/2023. I will be calling you soon to see how you are doing and determine your needs.    If you have any questions, please feel free to call me at [phone]. If you reach my voice mail, please leave a message and your phone number. If you are hearing impaired, please call the [place] Relay at [phone] or [phone] (zwdeoy-as-urywyg relay service).    I look forward to speaking with you soon.    Sincerely,          Luz Maria Pearce RN  [phone]  [email or web address]        [employer]          [facility]  [id number] DHS Approved ([id number])  [id number] (11/18)

## 2023-02-15 ENCOUNTER — OFFICE VISIT (OUTPATIENT)
Dept: FAMILY MEDICINE | Facility: CLINIC | Age: 73
End: 2023-02-15
Payer: COMMERCIAL

## 2023-02-15 VITALS
RESPIRATION RATE: 20 BRPM | HEART RATE: 71 BPM | OXYGEN SATURATION: 98 % | DIASTOLIC BLOOD PRESSURE: 68 MMHG | SYSTOLIC BLOOD PRESSURE: 130 MMHG | TEMPERATURE: 98.1 F

## 2023-02-15 DIAGNOSIS — K59.03 DRUG-INDUCED CONSTIPATION: ICD-10-CM

## 2023-02-15 DIAGNOSIS — S82.201A TIBIA/FIBULA FRACTURE, RIGHT, CLOSED, INITIAL ENCOUNTER: Primary | ICD-10-CM

## 2023-02-15 DIAGNOSIS — S92.324G CLOSED NONDISPLACED FRACTURE OF SECOND METATARSAL BONE OF RIGHT FOOT WITH DELAYED HEALING, SUBSEQUENT ENCOUNTER: ICD-10-CM

## 2023-02-15 DIAGNOSIS — S92.334D CLOSED NONDISPLACED FRACTURE OF THIRD METATARSAL BONE OF RIGHT FOOT WITH ROUTINE HEALING, SUBSEQUENT ENCOUNTER: ICD-10-CM

## 2023-02-15 DIAGNOSIS — S92.344D CLOSED NONDISPLACED FRACTURE OF FOURTH METATARSAL BONE OF RIGHT FOOT WITH ROUTINE HEALING, SUBSEQUENT ENCOUNTER: ICD-10-CM

## 2023-02-15 DIAGNOSIS — S82.401A TIBIA/FIBULA FRACTURE, RIGHT, CLOSED, INITIAL ENCOUNTER: Primary | ICD-10-CM

## 2023-02-15 PROCEDURE — 99214 OFFICE O/P EST MOD 30 MIN: CPT | Performed by: FAMILY MEDICINE

## 2023-02-15 RX ORDER — DOCUSATE SODIUM 100 MG/1
100 CAPSULE, LIQUID FILLED ORAL 2 TIMES DAILY
Qty: 60 CAPSULE | Refills: 3 | Status: SHIPPED | OUTPATIENT
Start: 2023-02-15 | End: 2023-03-15

## 2023-02-15 RX ORDER — OXYCODONE HYDROCHLORIDE 5 MG/1
TABLET ORAL
Status: CANCELLED | OUTPATIENT
Start: 2023-02-15

## 2023-02-15 RX ORDER — OXYCODONE HYDROCHLORIDE 5 MG/1
TABLET ORAL
COMMUNITY
Start: 2023-02-03 | End: 2023-02-15

## 2023-02-15 RX ORDER — OXYCODONE HYDROCHLORIDE 5 MG/1
TABLET ORAL
Qty: 30 TABLET | Refills: 0 | Status: SHIPPED | OUTPATIENT
Start: 2023-02-15 | End: 2024-02-28

## 2023-02-15 ASSESSMENT — PATIENT HEALTH QUESTIONNAIRE - PHQ9
SUM OF ALL RESPONSES TO PHQ QUESTIONS 1-9: 6
10. IF YOU CHECKED OFF ANY PROBLEMS, HOW DIFFICULT HAVE THESE PROBLEMS MADE IT FOR YOU TO DO YOUR WORK, TAKE CARE OF THINGS AT HOME, OR GET ALONG WITH OTHER PEOPLE: NOT DIFFICULT AT ALL
SUM OF ALL RESPONSES TO PHQ QUESTIONS 1-9: 6

## 2023-02-17 ENCOUNTER — OFFICE VISIT (OUTPATIENT)
Dept: PODIATRY | Facility: CLINIC | Age: 73
End: 2023-02-17
Attending: FAMILY MEDICINE
Payer: COMMERCIAL

## 2023-02-17 VITALS — BODY MASS INDEX: 27.38 KG/M2 | HEIGHT: 58 IN

## 2023-02-17 DIAGNOSIS — S92.344A CLOSED NONDISPLACED FRACTURE OF FOURTH METATARSAL BONE OF RIGHT FOOT, INITIAL ENCOUNTER: ICD-10-CM

## 2023-02-17 DIAGNOSIS — S82.201A TIBIA/FIBULA FRACTURE, RIGHT, CLOSED, INITIAL ENCOUNTER: ICD-10-CM

## 2023-02-17 DIAGNOSIS — S92.334A CLOSED NONDISPLACED FRACTURE OF THIRD METATARSAL BONE OF RIGHT FOOT, INITIAL ENCOUNTER: Primary | ICD-10-CM

## 2023-02-17 DIAGNOSIS — S82.401A TIBIA/FIBULA FRACTURE, RIGHT, CLOSED, INITIAL ENCOUNTER: ICD-10-CM

## 2023-02-17 DIAGNOSIS — S92.324G CLOSED NONDISPLACED FRACTURE OF SECOND METATARSAL BONE OF RIGHT FOOT WITH DELAYED HEALING, SUBSEQUENT ENCOUNTER: ICD-10-CM

## 2023-02-17 PROCEDURE — 99204 OFFICE O/P NEW MOD 45 MIN: CPT | Performed by: PODIATRIST

## 2023-02-17 ASSESSMENT — PAIN SCALES - GENERAL: PAINLEVEL: WORST PAIN (10)

## 2023-02-17 NOTE — PATIENT INSTRUCTIONS
Thank you for choosing [facility] Podiatry / Foot & Ankle Surgery!    DR. MAURICIO'S CLINIC LOCATIONS:     [facility] TRIAGE LINE: [phone]   [address] [place] [address] APPOINTMENTS: [phone]   [place] [address] RADIOLOGY: [phone]   (Every other Tues - Wed - Fri PM) SET UP SURGERY: [phone]    PHYSICAL THERAPY: [phone]   [facility] SPECIALTY BILLING QUESTIONS: [phone] [id number] FAX: [phone]   [place] [address]    (Thurs & Fri AM)      Recommendations:  You will want to wear the boot at all times when on your feet for a total of 6 to 8 weeks.  It can be removed when you are seated for gentle ankle range of motion exercises  It is a good idea to wiggle your toes  Use the knee-high sock to reduce swelling  Consider elevating your foot up above heart level to help reduce the swelling.  Cold application might be helpful.  Return to clinic in 3 weeks for repeat x-ray.    TOE & METATARSAL FRACTURES  The structure of the foot is complex, consisting of bones, muscles, tendons, and other soft tissues. Of the 26 bones in the foot, 19 are toe bones (phalanges) and metatarsal bones (the long bones in the midfoot). Fractures of the toe and metatarsal bones are common and require evaluation by a specialist. A foot and ankle surgeon should be seen for proper diagnosis and treatment, even if initial treatment has been received in an emergency room.  A fracture is a break in the bone. Fractures can be divided into two categories: traumatic fractures and stress fractures.  TRAUMATIC FRACTURES (also called acute fractures) are caused by a direct blow or impact, such as seriously stubbing your toe. Traumatic fractures can be displaced or non-displaced. If the fracture is displaced, the bone is broken in such a way that it has changed in position (dislocated).  Signs and symptoms of a traumatic fracture include:  You may hear a sound at the time of the break.    Pinpoint pain  (pain  at the place of impact) at the time the fracture occurs and perhaps for a few hours later, but often the pain goes away after several hours.   Crooked or abnormal appearance of the toe.   Bruising and swelling the next day.   It is not true that  if you can walk on it, it s not broken.  Evaluation by a foot and ankle surgeon is always recommended.   STRESS FRACTURES are tiny, hairline breaks that are usually caused by repetitive stress. Stress fractures often afflict athletes who, for example, too rapidly increase their running mileage. They can also be caused by an abnormal foot structure, deformities, or osteoporosis. Improper footwear may also lead to stress fractures. Stress fractures should not be ignored. They require proper medical attention to heal correctly.  Symptoms of stress fractures include:  Pain with or after normal activity   Pain that goes away when resting and then returns when standing or during activity    Pinpoint pain  (pain at the site of the fracture) when touched   Swelling, but no bruising   IMPROPER TREATMENT  Some people say that  the doctor can t do anything for a broken bone in the foot.  This is usually not true. In fact, if a fractured toe or metatarsal bone is not treated correctly, serious complications may develop. For example:  A deformity in the bony architecture which may limit the ability to move the foot or cause difficulty in fitting shoes   Arthritis, which may be caused by a fracture in a joint (the juncture where two bones meet), or may be a result of angular deformities that develop when a displaced fracture is severe or hasn t been properly corrected   Chronic pain and deformity   Non-union, or failure to heal, can lead to subsequent surgery or chronic pain.   PROPER TREATMENT FOR TOES  Fractures of the toe bones are almost always traumatic fractures. Treatment for traumatic fractures depends on the break itself and may include these options:  Rest. Sometimes rest is  all that is needed to treat a traumatic fracture of the toe.   Splinting. The toe may be fitted with a splint to keep it in a fixed position.   Rigid or stiff-soled shoe. Wearing a stiff-soled shoe protects the toe and helps keep it properly positioned.    Wilber taping  the fractured toe to another toe is sometimes appropriate, but in other cases it may be harmful.   Surgery. If the break is badly displaced or if the joint is affected, surgery may be necessary. Surgery often involves the use of fixation devices, such as pins.   PROPER TREATMENT OF METATARSALS  Breaks in the metatarsal bones may be either stress or traumatic fractures. Certain kinds of fractures of the metatarsal bones present unique challenges.  For example, sometimes a fracture of the first metatarsal bone (behind the big toe) can lead to arthritis. Since the big toe is used so frequently and bears more weight than other toes, arthritis in that area can make it painful to walk, bend, or even stand.  Another type of break, called a Pierson fracture, occurs at the base of the fifth metatarsal bone (behind the little toe). It is often misdiagnosed as an ankle sprain, and misdiagnosis can have serious consequences since sprains and fractures require different treatments. Your foot and ankle surgeon is an expert in correctly identifying these conditions as well as other problems of the foot.  Treatment of metatarsal fractures depends on the type and extent of the fracture, and may include:  Rest. Sometimes rest is the only treatment needed to promote healing of a stress or traumatic fracture of a metatarsal bone.   Avoid the offending activity. Because stress fractures result from repetitive stress, it is important to avoid the activity that led to the fracture. Crutches or a wheelchair are sometimes required to offload weight from the foot to give it time to heal.   Immobilization, casting, or rigid shoe. A stiff-soled shoe or other form of  immobilization may be used to protect the fractured bone while it is healing.   Surgery. Some traumatic fractures of the metatarsal bones require surgery, especially if the break is badly displaced.   Follow-up care. Your foot and ankle surgeon will provide instructions for care following surgical or non-surgical treatment. Physical therapy, exercises and rehabilitation may be included in a schedule for return to normal activities.   ANKLE FRACTURES  A fracture is a partial or complete break in a bone. Fractures in the ankle can range from the less serious avulsion injuries (small pieces of bone that have been pulled off) to severe shattering-type breaks of the tibia, fibula or both.  Ankle fractures are common injuries most often caused by the ankle rolling inward or outward. Many people mistake an ankle fracture for an ankle sprain, but they are quite different and therefore require an accurate and early diagnosis. They sometimes occur simultaneously.    SYMPTOMS  An ankle fracture is accompanied by one or all of these symptoms:  Pain at the site of the fracture, which in some cases can extend from the foot to the knee.   Significant swelling, which may occur along the length of the leg or may be more localized.   Blisters may occur over the fracture site. These should be promptly treated by a foot and ankle surgeon.   Bruising that develops soon after the injury.   Inability to walk; however, it is possible to walk with less severe breaks, so never rely on walking as a test of whether or not a bone has been fractured.   Change in the appearance of the ankle--it will look different from the other ankle.   Bone protruding through the skin--a sign that immediate care is needed. Fractures that salazar the skin require immediate attention because they can lead to severe infection and prolonged recovery     DIAGNOSIS  Following an ankle injury, it is important to have the ankle evaluated by a foot and ankle surgeon for  proper diagnosis and treatment. If you are unable to do so right away, go to the emergency room and then follow up with a foot and ankle surgeon as soon as possible for a more thorough assessment.  The affected limb will be examined by the foot and ankle surgeon who will touch specific areas to evaluate the injury. In addition, the surgeon may order x-rays and other imaging studies, as necessary.    NONSURGICAL TREATMENT  Treatment of ankle fractures depends on the type and severity of the injury. At first, the foot and ankle surgeon will want you to follow the RICE protocol:  Rest: Stay off the injured ankle. Walking may cause further injury.   Ice: Apply an ice pack to the injured area, placing a thin towel between the ice and the skin. Use ice for 20 minutes and then wait at least 40 minutes before icing again.   Compression: An elastic wrap should be used to control swelling.   Elevation: The ankle should be raised slightly above the level of your heart to reduce swelling.    Additional treatment options include:  Immobilization. Certain fractures are treated by protecting and restricting the ankle and foot in a cast or splint. This allows the bone to heal.   Prescription medications. To help relieve the pain, the surgeon may prescribe pain medications or anti-inflammatory drugs.    SURGICAL TREATMENT  For some ankle fractures, surgery is needed to repair the fracture and other soft tissue-related injuries, if present. The foot and ankle surgeon will select the procedure that is appropriate for your injury.    FOLLOW-UP  It is important to follow your surgeon s instructions after treatment. Failure to do so can lead to infection, deformity, arthritis and chronic pain.      AIRCAST / CAM WALKING BOOT INSTRUCTIONS  - Do NOT drive with CAM walker on. This is due to safety and legal issues.   - Do NOT wear the CAM walker on long car/train rides or on an airplane.  - Remove the CAM walker several times a day and do  "ankle range of motion (ROM) exercises/wiggle toes.  - It is recommended that a thick-soled shoe be worn on the other foot to offset any created leg length issue.    - You can purchase an \"even up\" on [employer] to place under the other shoe to help too.  - The boot does not have to be worn at night.   - There is an increased risk of developing a blood clot with lower extremity immobilization. ROM exercises and knee-high compression (tenso /ACE wrap) is recommended to lower that risk.   - You should seek medical attention if you experience calf swelling and/or pain, chest pain, or shortness of breath.   If you need to return or exchange the boot, please contact [facility] @ [phone]    "

## 2023-02-17 NOTE — LETTER
2/17/2023         RE: Main Morris  [address]  [facility] [address]        Dear Colleague,    Thank you for referring your patient, Main Morris, to the [facility]. Please see a copy of my visit note below.    ASSESSMENT:  Encounter Diagnoses   Name Primary?     fibular fracture, right, closed, initial encounter      Closed nondisplaced fracture of second metatarsal bone of right foot with delayed healing, subsequent encounter      Closed nondisplaced fracture of third metatarsal bone of right foot, initial encounter Yes     Closed nondisplaced fracture of fourth metatarsal bone of right foot, initial encounter      MEDICAL DECISION MAKING:  I personally reviewed the x-ray images found on the PACS system.  Fractures noted above are nondisplaced.  No indication for surgical intervention.  I support ongoing immobilization/conservative treatment.  She is encouraged to remove the boot when seated for gentle ankle range of motion exercises.  We discussed the importance of elevation for edema reduction.  She was provided some Tensogrip for compression.    They did not want to follow-up with me, due to the the time they had to wait today to see me as well as for the .  Unfortunately they check in at 1:15 and the appointment was at 2.    Total time spent on this patient's care today, date of service 2/17/2023, was greater than 45 minutes.  This involved review of medical records, history taking with the aid of an , physical exam, personal review of x-ray images, additional discussion regarding treatment plan with the aid of the , and documenting today's visit.    Disclaimer: This note consists of symbols derived from keyboarding, dictation and/or voice recognition software. As a result, there may be errors in the script that have gone undetected. Please consider this when interpreting information found in this chart.    Peña Arreola, WILLIAM, FACFAS,  "MS [facility] Department of Podiatry/Foot & Ankle Surgery      ____________________________________________________________________    HPI:       I was asked by Luc Mackey MD to evaluate Main Morris in consultation for a right fibula fractureand right foot metatarsal fractures.       History of recent fall at home.  She was taken to [facility] where x-rays revealed fractures involving the right distal fibula, second, third and fourth metatarsal bones.  All nondisplaced.    She is wearing an Aircast.  She said she was told to leave this on all of the time.    The interview and exam today was aided by a Tracy  on an iPad.    History taking was limited due to initial evaluation being out of our system.  I was unable to find any documentation.  Images were brought up on the PACS system.  Limited also by need for interpretation.  Much of the history taken from Dr. Mackey's office visit note, dated 2/15/2023.      *No past medical history on file.*  *No past surgical history on file.*  *  Current Outpatient Medications   Medication Sig Dispense Refill     acetaminophen (TYLENOL) 325 MG tablet [ACETAMINOPHEN (TYLENOL) 325 MG TABLET] Take 2 tablets (650 mg total) by mouth 3 (three) times a day. 100 tablet 2     docusate sodium (COLACE) 100 MG capsule Take 1 capsule (100 mg) by mouth 2 times daily 60 capsule 3     escitalopram (LEXAPRO) 10 MG tablet Take 1 tablet (10 mg) by mouth daily 30 tablet 8     naproxen (EC-NAPROSYN) 500 MG EC tablet Take 1 tablet (500 mg) by mouth 2 times daily (with meals) 60 tablet 9     naproxen (EC-NAPROXEN) 500 MG EC tablet [EC-NAPROXEN 500 MG EC TABLET] TAKE 1 TABLET (500 MG TOTAL) BY MOUTH 2 (TWO) TIMES A DAY WITH MEALS. 60 tablet 11     oxyCODONE (ROXICODONE) 5 MG tablet Take one tablet twice daily as needed   For pain 30 tablet 0     polyethylene glycol (MIRALAX) 17 GM/Dose powder Take 17 g by mouth daily 1700 g 6         EXAM:    Vitals: Ht 1.473 m (4' 10\")   BMI 27.38 kg/m  "   BMI: Body mass index is 27.38 kg/m .    Constitutional:  Main Morris is in no apparent distress, appears well-nourished.  Cooperative with history and physical exam.    Vascular:  Pedal pulses are palpable for both the DP and PT arteries.  CFT < 3 sec.       Neuro: Light touch sensation is intact to the L4, L5, S1 distributions  No evidence of weakness, spasticity, or contracture in the lower extremities.     Derm: Normal texture and turgor.  No erythema, ecchymosis, or cyanosis.  No open lesions.     Musculoskeletal:    There is generalized moderate edema involving the right ankle and foot.  Palpatory exam reveals pain consistent with locations of fractures.    XR Right foot and Ankle: I personally reviewed the right foot and ankle films.  Nondisplaced fractures as noted above.      Again, thank you for allowing me to participate in the care of your patient.        Sincerely,        Peña Arreola DPM

## 2023-02-17 NOTE — PROGRESS NOTES
ASSESSMENT:  Encounter Diagnoses   Name Primary?     fibular fracture, right, closed, initial encounter      Closed nondisplaced fracture of second metatarsal bone of right foot with delayed healing, subsequent encounter      Closed nondisplaced fracture of third metatarsal bone of right foot, initial encounter Yes     Closed nondisplaced fracture of fourth metatarsal bone of right foot, initial encounter      MEDICAL DECISION MAKING:  I personally reviewed the x-ray images found on the PACS system.  Fractures noted above are nondisplaced.  No indication for surgical intervention.  I support ongoing immobilization/conservative treatment.  She is encouraged to remove the boot when seated for gentle ankle range of motion exercises.  We discussed the importance of elevation for edema reduction.  She was provided some Tensogrip for compression.    They did not want to follow-up with me, due to the the time they had to wait today to see me as well as for the .  Unfortunately they check in at 1:15 and the appointment was at 2.    Total time spent on this patient's care today, date of service 2/17/2023, was greater than 45 minutes.  This involved review of medical records, history taking with the aid of an , physical exam, personal review of x-ray images, additional discussion regarding treatment plan with the aid of the , and documenting today's visit.    Disclaimer: This note consists of symbols derived from keyboarding, dictation and/or voice recognition software. As a result, there may be errors in the script that have gone undetected. Please consider this when interpreting information found in this chart.    Peña Arreola DPM, FACFAS, MS    [place] Department of Podiatry/Foot & Ankle Surgery      ____________________________________________________________________    HPI:       I was asked by Luc Mackey MD to evaluate Main Morris in consultation for a right fibula fractureand right foot  "metatarsal fractures.       History of recent fall at home.  She was taken to [facility] where x-rays revealed fractures involving the right distal fibula, second, third and fourth metatarsal bones.  All nondisplaced.    She is wearing an Aircast.  She said she was told to leave this on all of the time.    The interview and exam today was aided by a Tracy  on an iPad.    History taking was limited due to initial evaluation being out of our system.  I was unable to find any documentation.  Images were brought up on the PACS system.  Limited also by need for interpretation.  Much of the history taken from Dr. Mackey's office visit note, dated 2/15/2023.      *No past medical history on file.*  *No past surgical history on file.*  *  Current Outpatient Medications   Medication Sig Dispense Refill     acetaminophen (TYLENOL) 325 MG tablet [ACETAMINOPHEN (TYLENOL) 325 MG TABLET] Take 2 tablets (650 mg total) by mouth 3 (three) times a day. 100 tablet 2     docusate sodium (COLACE) 100 MG capsule Take 1 capsule (100 mg) by mouth 2 times daily 60 capsule 3     escitalopram (LEXAPRO) 10 MG tablet Take 1 tablet (10 mg) by mouth daily 30 tablet 8     naproxen (EC-NAPROSYN) 500 MG EC tablet Take 1 tablet (500 mg) by mouth 2 times daily (with meals) 60 tablet 9     naproxen (EC-NAPROXEN) 500 MG EC tablet [EC-NAPROXEN 500 MG EC TABLET] TAKE 1 TABLET (500 MG TOTAL) BY MOUTH 2 (TWO) TIMES A DAY WITH MEALS. 60 tablet 11     oxyCODONE (ROXICODONE) 5 MG tablet Take one tablet twice daily as needed   For pain 30 tablet 0     polyethylene glycol (MIRALAX) 17 GM/Dose powder Take 17 g by mouth daily 1700 g 6         EXAM:    Vitals: Ht 1.473 m (4' 10\")   BMI 27.38 kg/m    BMI: Body mass index is 27.38 kg/m .    Constitutional:  Main Morris is in no apparent distress, appears well-nourished.  Cooperative with history and physical exam.    Vascular:  Pedal pulses are palpable for both the DP and PT arteries.  CFT < 3 sec.   "     Neuro: Light touch sensation is intact to the L4, L5, S1 distributions  No evidence of weakness, spasticity, or contracture in the lower extremities.     Derm: Normal texture and turgor.  No erythema, ecchymosis, or cyanosis.  No open lesions.     Musculoskeletal:    There is generalized moderate edema involving the right ankle and foot.  Palpatory exam reveals pain consistent with locations of fractures.    XR Right foot and Ankle: I personally reviewed the right foot and ankle films.  Nondisplaced fractures as noted above.

## 2023-03-15 ENCOUNTER — OFFICE VISIT (OUTPATIENT)
Dept: FAMILY MEDICINE | Facility: CLINIC | Age: 73
End: 2023-03-15
Payer: COMMERCIAL

## 2023-03-15 VITALS
SYSTOLIC BLOOD PRESSURE: 128 MMHG | RESPIRATION RATE: 20 BRPM | HEIGHT: 58 IN | BODY MASS INDEX: 27.38 KG/M2 | OXYGEN SATURATION: 97 % | TEMPERATURE: 97.7 F | HEART RATE: 81 BPM | DIASTOLIC BLOOD PRESSURE: 70 MMHG

## 2023-03-15 DIAGNOSIS — F32.9 MAJOR DEPRESSIVE DISORDER WITH SINGLE EPISODE, REMISSION STATUS UNSPECIFIED: ICD-10-CM

## 2023-03-15 DIAGNOSIS — S92.344D CLOSED NONDISPLACED FRACTURE OF FOURTH METATARSAL BONE OF RIGHT FOOT WITH ROUTINE HEALING, SUBSEQUENT ENCOUNTER: ICD-10-CM

## 2023-03-15 DIAGNOSIS — K59.03 DRUG-INDUCED CONSTIPATION: ICD-10-CM

## 2023-03-15 DIAGNOSIS — S82.831D CLOSED FRACTURE OF DISTAL END OF RIGHT FIBULA WITH ROUTINE HEALING, UNSPECIFIED FRACTURE MORPHOLOGY, SUBSEQUENT ENCOUNTER: ICD-10-CM

## 2023-03-15 DIAGNOSIS — S92.334D CLOSED NONDISPLACED FRACTURE OF THIRD METATARSAL BONE OF RIGHT FOOT WITH ROUTINE HEALING, SUBSEQUENT ENCOUNTER: Primary | ICD-10-CM

## 2023-03-15 PROCEDURE — 99214 OFFICE O/P EST MOD 30 MIN: CPT | Performed by: FAMILY MEDICINE

## 2023-03-15 RX ORDER — DOCUSATE SODIUM 100 MG/1
100 CAPSULE, LIQUID FILLED ORAL 2 TIMES DAILY
Qty: 60 CAPSULE | Refills: 3 | Status: SHIPPED | OUTPATIENT
Start: 2023-03-15 | End: 2023-06-30

## 2023-03-15 RX ORDER — ESCITALOPRAM OXALATE 10 MG/1
10 TABLET ORAL DAILY
Qty: 30 TABLET | Refills: 8 | Status: SHIPPED | OUTPATIENT
Start: 2023-03-15 | End: 2023-06-30

## 2023-03-15 NOTE — PROGRESS NOTES
"  Assessment & Plan     Major depressive disorder with single episode, remission status unspecified  Patient's mood is normal according to family she does not feel depressed does not feel sad she is sleeping well.  - escitalopram (LEXAPRO) 10 MG tablet; Take 1 tablet (10 mg) by mouth daily    Drug-induced constipation    No constipation noted.  - docusate sodium (COLACE) 100 MG capsule; Take 1 capsule (100 mg) by mouth 2 times daily    Closed nondisplaced fracture of third metatarsal bone of right foot with routine healing, subsequent encounter    Pain has decreased she is not using oxycodone    Closed nondisplaced fracture of fourth metatarsal bone of right foot with routine healing, subsequent encounter    Seen by podiatry no operative repair indicated we will follow-up for plain radiograph in 2 weeks    Closed fracture of distal end of right fibula with routine healing, unspecified fracture morphology, subsequent encounter    No tenderness noted , pain has decreased . No numbness  noted  - XR Tibia and Fibula Right 2 Views; Future  - XR Foot Right G/E 3 Views; Future             BMI:   Estimated body mass index is 27.38 kg/m  as calculated from the following:    Height as of this encounter: 1.473 m (4' 10\").    Weight as of 8/23/22: 59.4 kg (131 lb).           Return in about 2 weeks (around 3/29/2023).    Luc Mackey MD  [facility]   Main is a 72 year old accompanied by her spouse and daughter, presenting for the following health issues:  FOLLOW UP  (ORTHO)      HPI   72-year-old female here for follow-up she has a nondisplaced right distal fibular fracture and a fracture of the right third and fourth metatarsals without displacement she says her leg pain and foot pain is decreased she has not noticed any swelling.  She still wearing a walking boot she saw podiatry several weeks ago and she was told that she does not need surgery.  She has not used her oxycodone for the last 4 " "days she only uses a tablet when she needs to she is never used more than once a day she says because she is doing a stool softener she is no longer constipated naproxen is helping with her daily pain.  She denies having any dizziness.  She is not fatigued.  She has been sleeping well        Review of Systems   Constitutional, HEENT, cardiovascular, pulmonary, gi and gu systems are negative, except as otherwise noted.      Objective    /70 (BP Location: Left arm, Patient Position: Sitting, Cuff Size: Adult Regular)   Pulse 81   Temp 97.7  F (36.5  C) (Oral)   Resp 20   Ht 1.473 m (4' 10\")   SpO2 97%   BMI 27.38 kg/m    Body mass index is 27.38 kg/m .  Physical Exam   GENERAL: healthy, alert and no distress  EYES: Eyes grossly normal to inspection, PERRL and conjunctivae and sclerae normal  MS: no gross musculoskeletal defects noted, no edema swelling noted at the base of the right foot near the heel  SKIN: no suspicious lesions or rashes  NEURO: Normal strength and tone, mentation intact and speech normal gross sensation of the dorsum of the right foot was normal she denies any pain in the right calf.  On palpation.  Gait testing was not done  PSYCH: mentation appears normal, affect normal/bright            "

## 2023-03-27 ENCOUNTER — PATIENT OUTREACH (OUTPATIENT)
Dept: GERIATRIC MEDICINE | Facility: CLINIC | Age: 73
End: 2023-03-27
Payer: COMMERCIAL

## 2023-03-27 NOTE — PROGRESS NOTES
Encounter opened due to Regulatory [employer] Update to open FVP Program.      Mela Bates  Care Management Specialist  [facility]  [phone]

## 2023-03-27 NOTE — PROGRESS NOTES
Encounter opened due to Regulatory [employer] Update to close FVP Program.      Mela Bates  Care Management Specialist  [facility]  [phone]

## 2023-03-29 ENCOUNTER — ANCILLARY PROCEDURE (OUTPATIENT)
Dept: GENERAL RADIOLOGY | Facility: CLINIC | Age: 73
End: 2023-03-29
Attending: FAMILY MEDICINE
Payer: COMMERCIAL

## 2023-03-29 DIAGNOSIS — S82.831D CLOSED FRACTURE OF DISTAL END OF RIGHT FIBULA WITH ROUTINE HEALING, UNSPECIFIED FRACTURE MORPHOLOGY, SUBSEQUENT ENCOUNTER: ICD-10-CM

## 2023-03-29 PROCEDURE — 73630 X-RAY EXAM OF FOOT: CPT | Mod: TC | Performed by: RADIOLOGY

## 2023-04-05 ENCOUNTER — PATIENT OUTREACH (OUTPATIENT)
Dept: GERIATRIC MEDICINE | Facility: CLINIC | Age: 73
End: 2023-04-05
Payer: COMMERCIAL

## 2023-04-05 NOTE — PROGRESS NOTES
[facility] Care Coordination Contact    Called member to schedule annual HRA home visit. HRA has been scheduled for 4/11/23 at 10am.  Called Surad:  Haleigh/Criselda Garcia and scheduled an  for the home visit.     Luz Maria Pearce RN  [facility]  [phone]      
Yes

## 2023-04-11 ENCOUNTER — PATIENT OUTREACH (OUTPATIENT)
Dept: GERIATRIC MEDICINE | Facility: CLINIC | Age: 73
End: 2023-04-11
Payer: COMMERCIAL

## 2023-04-11 SDOH — ECONOMIC STABILITY: INCOME INSECURITY: IN THE LAST 12 MONTHS, WAS THERE A TIME WHEN YOU WERE NOT ABLE TO PAY THE MORTGAGE OR RENT ON TIME?: NO

## 2023-04-11 SDOH — ECONOMIC STABILITY: TRANSPORTATION INSECURITY
IN THE PAST 12 MONTHS, HAS LACK OF TRANSPORTATION KEPT YOU FROM MEETINGS, WORK, OR FROM GETTING THINGS NEEDED FOR DAILY LIVING?: NO

## 2023-04-11 SDOH — ECONOMIC STABILITY: TRANSPORTATION INSECURITY
IN THE PAST 12 MONTHS, HAS THE LACK OF TRANSPORTATION KEPT YOU FROM MEDICAL APPOINTMENTS OR FROM GETTING MEDICATIONS?: NO

## 2023-04-11 SDOH — HEALTH STABILITY: PHYSICAL HEALTH: ON AVERAGE, HOW MANY DAYS PER WEEK DO YOU ENGAGE IN MODERATE TO STRENUOUS EXERCISE (LIKE A BRISK WALK)?: 0 DAYS

## 2023-04-11 SDOH — HEALTH STABILITY: PHYSICAL HEALTH: ON AVERAGE, HOW MANY MINUTES DO YOU ENGAGE IN EXERCISE AT THIS LEVEL?: 0 MIN

## 2023-04-11 ASSESSMENT — LIFESTYLE VARIABLES
HOW MANY STANDARD DRINKS CONTAINING ALCOHOL DO YOU HAVE ON A TYPICAL DAY: PATIENT DOES NOT DRINK
SKIP TO QUESTIONS 9-10: 1
HOW OFTEN DO YOU HAVE A DRINK CONTAINING ALCOHOL: NEVER
HOW OFTEN DO YOU HAVE SIX OR MORE DRINKS ON ONE OCCASION: NEVER
AUDIT-C TOTAL SCORE: 0

## 2023-04-11 NOTE — Clinical Note
Home visit completed on 4/11/23.  Personal care attendant hours remain the same as last year's assessment. No DME orders needed.  Luz Maria Pearce RN [facility] [phone]

## 2023-04-12 NOTE — PROGRESS NOTES
[facility] Care Coordination Contact    [facility] Home Visit Assessment     Home visit for Health Risk Assessment with Main Morris completed on April 11, 2023    Type of residence:: Apartment  Current living arrangement:: I live in a private home with family, I live in a private home with spouse     Assessment completed with:: Patient, Friend    Current Care Plan  Member currently receiving the following home care services:     Member currently receiving the following community resources: PCA      Medication Review  Medication reconciliation completed in Epic: Yes  Medication set-up & administration: Independent-does not set up.  Self-administers medications.  Medication Risk Assessment Medication (1 or more, place referral to MTM): Lacks understanding of medication regimen, Difficulty adhering to medication regimen and Recent falls within past year  MTM Referral Placed: No: Main Morris decline MTM referral as she will not go    Mental/Behavioral Health   Depression Screening:   PHQ-2 Total Score (Adult) - Positive if 3 or more points; Administer PHQ-9 if positive: 0       Mental health DX:: Yes   Mental health DX how managed:: Medication    Falls Assessment:   Fallen 2 or more times in the past year?: No   Any fall with injury in the past year?: Yes (Had a fall 02/3/23 and was seen at [facility].  Right ankle fracture.)    ADL/IADL Dependencies:   Dependent ADLs:: Ambulation-walker, Toileting, Bathing, Transfers, Dressing       [facility] sponsored benefits: Shared information re: Silver Sneakers/gym memberships, ASA, Calcium +D.    PCA Assessment completed at visit: Yes Annual PCA assessment indicated 4hours per day / 16 units per day of PCA. This is the same as the previous assessment.     Elderly Waiver Eligibility: Yes, but member declines EW services; will not open to EW    Care Plan & Recommendations: Main currently receives 4 hours/day of PCA services to assist in dressing, bathing,  transfers, mobility, and toileting.  Main does not understand her medication but declines MTM.  She reports she takes her medication as instructed.  Pill box given to help set up medication for Mu.  Family and friends continue to provide informal support such as paperwork, shopping, meal prepping, cleaning and transportation.  PCP currently manages Mu's depression with medication.       See [facility] for detailed assessment information.    Follow-Up Plan: Member informed of future contact, plan to f/u with member with a 6 month telephone assessment.  Contact information shared with member and family, encouraged member to call with any questions or concerns at any time.    [facility] care continuum providers: Please see Snapshot and Care Management Flowsheets for Specific details of care plan.    This CC note routed to PCP.    Luz Maria Pearce RN  [facility]  [phone]

## 2023-04-13 ENCOUNTER — PATIENT OUTREACH (OUTPATIENT)
Dept: GERIATRIC MEDICINE | Facility: CLINIC | Age: 73
End: 2023-04-13
Payer: COMMERCIAL

## 2023-04-13 NOTE — LETTER
April 13, 2023    MAIN BOOGIE  [address]  [place] [address]        Dear Main:    At [facility], we re dedicated to improving your health and wellness. Enclosed is the Care Plan developed with you on 4/11/2023. Please review the Care Plan carefully.    As a reminder, during your visit we talked about:  Ways to manage your physical and mental health  Using health care to maintain and improve your health   Your preventive care needs     Remember to contact your care coordinator if you:  Are hospitalized, or plan to be hospitalized   Have a fall    Have a change in your physical or mental health  Need help finding support or services    If you have questions, or don t agree with your Care Plan, call me at [phone]. You can also call me if your needs change. TTY users, call the [place] [employer] at (838) or 1-412.157.2924 (frdgou-oo-luiokp relay service).    Sincerely,          Luz Maria Pearce RN  [phone]  [email or web address]        [facility] [employer]    [id number] accepted    [id number] (07/2022)

## 2023-04-13 NOTE — PROGRESS NOTES
[facility] Care Coordination Contact    Received after visit chart from care coordinator.  Completed following tasks: Mailed copy of care plan to client, Updated services in Database, Mailed copy of the signed POC/PCA Mailed Safe Medication Disposal       [facility]:  Emailed completed PCA assessment to [facility].  Faxed copy of PCA assessment to PCA Agency and mailed copy to member.  Faxed MD Communication to PCP.     Mela Bates  Care Management Specialist  [facility]  [phone]

## 2023-05-05 ENCOUNTER — TELEPHONE (OUTPATIENT)
Dept: MAMMOGRAPHY | Facility: CLINIC | Age: 73
End: 2023-05-05
Payer: COMMERCIAL

## 2023-05-05 NOTE — TELEPHONE ENCOUNTER
Patient Quality Outreach    Patient is due for the following:   Breast Cancer Screening - Mammogram    Next Steps:   Patient was scheduled for mammo    Type of outreach:    Scheduled for mammo, 5/9 at 11:00    Next Steps:  Reach out within 90 days via Phone.    Max number of attempts reached: No. Will try again in 90 days if patient still on fail list.    Questions for provider review:    None           GORDO Duong  Chart routed to Care Team.

## 2023-05-09 ENCOUNTER — ANCILLARY PROCEDURE (OUTPATIENT)
Dept: MAMMOGRAPHY | Facility: CLINIC | Age: 73
End: 2023-05-09
Attending: FAMILY MEDICINE
Payer: COMMERCIAL

## 2023-05-09 ENCOUNTER — PATIENT OUTREACH (OUTPATIENT)
Dept: CARE COORDINATION | Facility: CLINIC | Age: 73
End: 2023-05-09

## 2023-05-09 DIAGNOSIS — Z12.31 VISIT FOR SCREENING MAMMOGRAM: ICD-10-CM

## 2023-05-09 PROCEDURE — 77067 SCR MAMMO BI INCL CAD: CPT | Mod: TC | Performed by: STUDENT IN AN ORGANIZED HEALTH CARE EDUCATION/TRAINING PROGRAM

## 2023-05-23 ENCOUNTER — PATIENT OUTREACH (OUTPATIENT)
Dept: CARE COORDINATION | Facility: CLINIC | Age: 73
End: 2023-05-23
Payer: COMMERCIAL

## 2023-06-30 ENCOUNTER — OFFICE VISIT (OUTPATIENT)
Dept: FAMILY MEDICINE | Facility: CLINIC | Age: 73
End: 2023-06-30
Payer: COMMERCIAL

## 2023-06-30 VITALS
DIASTOLIC BLOOD PRESSURE: 70 MMHG | WEIGHT: 137.5 LBS | BODY MASS INDEX: 28.86 KG/M2 | HEART RATE: 84 BPM | TEMPERATURE: 97.9 F | HEIGHT: 58 IN | OXYGEN SATURATION: 98 % | SYSTOLIC BLOOD PRESSURE: 110 MMHG

## 2023-06-30 DIAGNOSIS — S82.301D CLOSED FRACTURE OF DISTAL END OF RIGHT TIBIA WITH ROUTINE HEALING, UNSPECIFIED FRACTURE MORPHOLOGY, SUBSEQUENT ENCOUNTER: ICD-10-CM

## 2023-06-30 DIAGNOSIS — K59.03 DRUG-INDUCED CONSTIPATION: ICD-10-CM

## 2023-06-30 DIAGNOSIS — Z00.00 ROUTINE HISTORY AND PHYSICAL EXAMINATION OF ADULT: ICD-10-CM

## 2023-06-30 DIAGNOSIS — Z11.59 NEED FOR HEPATITIS C SCREENING TEST: Primary | ICD-10-CM

## 2023-06-30 DIAGNOSIS — M79.604 PAIN OF RIGHT LOWER EXTREMITY: ICD-10-CM

## 2023-06-30 DIAGNOSIS — K59.01 SLOW TRANSIT CONSTIPATION: ICD-10-CM

## 2023-06-30 DIAGNOSIS — F32.9 MAJOR DEPRESSIVE DISORDER WITH SINGLE EPISODE, REMISSION STATUS UNSPECIFIED: ICD-10-CM

## 2023-06-30 LAB
ALBUMIN SERPL BCG-MCNC: 4.3 G/DL (ref 3.5–5.2)
ALP SERPL-CCNC: 101 U/L (ref 35–104)
ALT SERPL W P-5'-P-CCNC: 45 U/L (ref 0–50)
ANION GAP SERPL CALCULATED.3IONS-SCNC: 12 MMOL/L (ref 7–15)
AST SERPL W P-5'-P-CCNC: 37 U/L (ref 0–45)
BILIRUB SERPL-MCNC: 0.8 MG/DL
BUN SERPL-MCNC: 12.5 MG/DL (ref 8–23)
CALCIUM SERPL-MCNC: 9.1 MG/DL (ref 8.8–10.2)
CHLORIDE SERPL-SCNC: 106 MMOL/L (ref 98–107)
CHOLEST SERPL-MCNC: 283 MG/DL
CREAT SERPL-MCNC: 0.6 MG/DL (ref 0.51–0.95)
DEPRECATED HCO3 PLAS-SCNC: 21 MMOL/L (ref 22–29)
GFR SERPL CREATININE-BSD FRML MDRD: >90 ML/MIN/1.73M2
GLUCOSE SERPL-MCNC: 98 MG/DL (ref 70–99)
HDLC SERPL-MCNC: 48 MG/DL
LDLC SERPL CALC-MCNC: 201 MG/DL
NONHDLC SERPL-MCNC: 235 MG/DL
POTASSIUM SERPL-SCNC: 4.2 MMOL/L (ref 3.4–5.3)
PROT SERPL-MCNC: 7.2 G/DL (ref 6.4–8.3)
SODIUM SERPL-SCNC: 139 MMOL/L (ref 136–145)
TRIGL SERPL-MCNC: 171 MG/DL

## 2023-06-30 PROCEDURE — 80061 LIPID PANEL: CPT | Performed by: FAMILY MEDICINE

## 2023-06-30 PROCEDURE — 99397 PER PM REEVAL EST PAT 65+ YR: CPT | Performed by: FAMILY MEDICINE

## 2023-06-30 PROCEDURE — 86803 HEPATITIS C AB TEST: CPT | Performed by: FAMILY MEDICINE

## 2023-06-30 PROCEDURE — 80053 COMPREHEN METABOLIC PANEL: CPT | Performed by: FAMILY MEDICINE

## 2023-06-30 PROCEDURE — 99213 OFFICE O/P EST LOW 20 MIN: CPT | Mod: 25 | Performed by: FAMILY MEDICINE

## 2023-06-30 PROCEDURE — 36415 COLL VENOUS BLD VENIPUNCTURE: CPT | Performed by: FAMILY MEDICINE

## 2023-06-30 RX ORDER — DOCUSATE SODIUM 100 MG/1
100 CAPSULE, LIQUID FILLED ORAL 2 TIMES DAILY
Qty: 60 CAPSULE | Refills: 3 | Status: SHIPPED | OUTPATIENT
Start: 2023-06-30 | End: 2023-10-31

## 2023-06-30 RX ORDER — ESCITALOPRAM OXALATE 10 MG/1
10 TABLET ORAL DAILY
Qty: 30 TABLET | Refills: 8 | Status: SHIPPED | OUTPATIENT
Start: 2023-06-30 | End: 2023-10-31

## 2023-06-30 RX ORDER — POLYETHYLENE GLYCOL 3350 17 G/17G
17 POWDER, FOR SOLUTION ORAL DAILY
Qty: 1700 G | Refills: 6 | Status: SHIPPED | OUTPATIENT
Start: 2023-06-30 | End: 2023-10-31

## 2023-06-30 ASSESSMENT — ENCOUNTER SYMPTOMS
SORE THROAT: 0
CONSTIPATION: 1
BREAST MASS: 0
DYSURIA: 0
PALPITATIONS: 0
HEADACHES: 0
DIZZINESS: 0
HEMATURIA: 0
WEAKNESS: 0
NERVOUS/ANXIOUS: 0
HEMATOCHEZIA: 0
NAUSEA: 0
FEVER: 0
FREQUENCY: 0
COUGH: 0
JOINT SWELLING: 1
ABDOMINAL PAIN: 0
ARTHRALGIAS: 1
HEARTBURN: 0
DIARRHEA: 0
MYALGIAS: 0
PARESTHESIAS: 0
CHILLS: 0
SHORTNESS OF BREATH: 0
EYE PAIN: 1

## 2023-06-30 ASSESSMENT — ACTIVITIES OF DAILY LIVING (ADL)
CURRENT_FUNCTION: HOUSEWORK REQUIRES ASSISTANCE
CURRENT_FUNCTION: PREPARING MEALS REQUIRES ASSISTANCE
CURRENT_FUNCTION: BATHING REQUIRES ASSISTANCE
CURRENT_FUNCTION: SHOPPING REQUIRES ASSISTANCE
CURRENT_FUNCTION: TRANSPORTATION REQUIRES ASSISTANCE
CURRENT_FUNCTION: LAUNDRY REQUIRES ASSISTANCE

## 2023-06-30 ASSESSMENT — PATIENT HEALTH QUESTIONNAIRE - PHQ9
SUM OF ALL RESPONSES TO PHQ QUESTIONS 1-9: 5
10. IF YOU CHECKED OFF ANY PROBLEMS, HOW DIFFICULT HAVE THESE PROBLEMS MADE IT FOR YOU TO DO YOUR WORK, TAKE CARE OF THINGS AT HOME, OR GET ALONG WITH OTHER PEOPLE: SOMEWHAT DIFFICULT
SUM OF ALL RESPONSES TO PHQ QUESTIONS 1-9: 5

## 2023-06-30 NOTE — PROGRESS NOTES
"   SUBJECTIVE:   CC: Main is an 73 year old who presents for preventive health visit.       6/30/2023     9:12 AM   Additional Questions   Roomed by Marcia Aldridge   Accompanied by PCA: Rebecca     Healthy Habits:     In general, how would you rate your overall health?  Fair    Frequency of exercise:  2-3 days/week    Duration of exercise:  Less than 15 minutes    Do you usually eat at least 4 servings of fruit and vegetables a day, include whole grains    & fiber and avoid regularly eating high fat or \"junk\" foods?  Yes    Taking medications regularly:  Yes    Medication side effects:  None    Ability to successfully perform activities of daily living:  Transportation requires assistance, shopping requires assistance, preparing meals requires assistance, housework requires assistance, bathing requires assistance and laundry requires assistance    Home Safety:  No safety concerns identified    Hearing Impairment:  No hearing concerns    In the past 6 months, have you been bothered by leaking of urine?  No    In general, how would you rate your overall mental or emotional health?  Fair    Additional concerns today:  No      Today's PHQ-9 Score:       6/30/2023     8:59 AM   PHQ-9 SCORE   PHQ-9 Total Score MyChart 5 (Mild depression)   PHQ-9 Total Score 5             History of presenting illness    73-year-old female here with her granddaughter for annual physical exam she says she sometimes feels discomfort normally upper end of her right leg near her knee where she had a fracture she says it hurts when she walks for long distances short distances do not cause any pain she has not found or felt any instability in the joint.  No falls have been reported at home.  She states that she has been able to get her medications sleeping well she is not constipated at this time.          Social History     Tobacco Use     Smoking status: Former     Passive exposure: Past     Smokeless tobacco: Former     Types: Chew   Substance Use " Topics     Alcohol use: No             6/30/2023     9:06 AM   Alcohol Use   Prescreen: >3 drinks/day or >7 drinks/week? Not Applicable     Reviewed orders with patient.  Reviewed health maintenance and updated orders accordingly - Yes  Lab work is in process  Labs reviewed in EPIC    Breast Cancer Screening:        History of abnormal Pap smear: NO - age 67 - see link Cervical Cytology Screening Guidelines     Reviewed and updated as needed this visit by clinical staff   Tobacco  Allergies  Meds              Reviewed and updated as needed this visit by Provider                     Review of Systems   Constitutional: Negative for chills and fever.   HENT: Negative for congestion, ear pain, hearing loss and sore throat.    Eyes: Positive for pain and visual disturbance.   Respiratory: Negative for cough and shortness of breath.    Cardiovascular: Positive for peripheral edema. Negative for chest pain and palpitations.   Gastrointestinal: Positive for constipation. Negative for abdominal pain, diarrhea, heartburn, hematochezia and nausea.   Breasts:  Negative for tenderness, breast mass and discharge.   Genitourinary: Negative for dysuria, frequency, genital sores, hematuria, pelvic pain, urgency, vaginal bleeding and vaginal discharge.   Musculoskeletal: Positive for arthralgias and joint swelling. Negative for myalgias.   Skin: Positive for rash.   Neurological: Negative for dizziness, weakness, headaches and paresthesias.   Psychiatric/Behavioral: Negative for mood changes. The patient is not nervous/anxious.      CONSTITUTIONAL: NEGATIVE for fever, chills, change in weight  INTEGUMENTARY/SKIN: NEGATIVE for worrisome rashes, moles or lesions  EYES: NEGATIVE for vision changes or irritation  ENT: NEGATIVE for ear, mouth and throat problems  RESP: NEGATIVE for significant cough or SOB  BREAST: NEGATIVE for masses, tenderness or discharge  CV: NEGATIVE for chest pain, palpitations or peripheral edema  GI: NEGATIVE  "for nausea, abdominal pain, heartburn, or change in bowel habits  : NEGATIVE for unusual urinary or vaginal symptoms. No vaginal bleeding.  MUSCULOSKELETAL: NEGATIVE for significant arthralgias or myalgia  NEURO: NEGATIVE for weakness, dizziness or paresthesias  PSYCHIATRIC: NEGATIVE for changes in mood or affect      OBJECTIVE:   /70 (BP Location: Left arm, Patient Position: Sitting, Cuff Size: Adult Regular)   Pulse 84   Temp 97.9  F (36.6  C) (Oral)   Ht 1.473 m (4' 10\")   Wt 62.4 kg (137 lb 8 oz)   SpO2 98%   BMI 28.74 kg/m    Physical Exam  GENERAL: healthy, alert and no distress  EYES: Eyes grossly normal to inspection, PERRL and conjunctivae and sclerae normal  HENT: ear canals and TM's normal, nose and mouth without ulcers or lesions  NECK: no adenopathy, no asymmetry, masses, or scars and thyroid normal to palpation  RESP: lungs clear to auscultation - no rales, rhonchi or wheezes  CV: regular rate and rhythm, normal S1 S2, no S3 or S4, no murmur, click or rub, no peripheral edema and peripheral pulses strong  ABDOMEN: soft, nontender, no hepatosplenomegaly, no masses and bowel sounds normal  MS:   Tenderness noted over the soft tissue swelling on the distal aspect of the right tib-fib area.  No fluctuance noted.  No crepitus noted.  SKIN: no suspicious lesions or rashes  NEURO: Normal strength and tone, mentation intact and speech normal  PSYCH: mentation appears normal, affect normal/bright    Diagnostic Test Results:  Labs reviewed in Epic    ASSESSMENT/PLAN:       ICD-10-CM    1. Need for hepatitis C screening test  Z11.59 Hepatitis C Screen Reflex to HCV RNA Quant and Genotype     Hepatitis C Screen Reflex to HCV RNA Quant and Genotype      2. Slow transit constipation she is not constipated however I am refilling her MiraLAX. K59.01 polyethylene glycol (MIRALAX) 17 GM/Dose powder      3. Major depressive disorder with single episode, remission status unspecified denies symptoms.  Says " "she is sleeping well she did not bring her medication in for review F32.9 escitalopram (LEXAPRO) 10 MG tablet      4. Routine history and physical examination of adult anticipatory guidance discussed she declined to have a colonoscopy or Cologuard testing.  She declined to go for DEXA scan today Z00.00 Lipid panel reflex to direct LDL Non-fasting     Comprehensive metabolic panel (BMP + Alb, Alk Phos, ALT, AST, Total. Bili, TP)     Lipid panel reflex to direct LDL Non-fasting     Comprehensive metabolic panel (BMP + Alb, Alk Phos, ALT, AST, Total. Bili, TP)      5. Drug-induced constipation  K59.03 docusate sodium (COLACE) 100 MG capsule      6. Pain of right lower extremity  M79.604 XR Tibia and Fibula Right 2 Views      7. Closed fracture of distal end of right tibia with routine healing, unspecified fracture morphology, subsequent encounter discomfort noted at the distal end of the right tib-fib area obtain an x-ray today there is no evidence of a fracture.  The fracture site is healing well.  She will continue using naproxen as needed S82.301D                 COUNSELING:  Reviewed preventive health counseling, as reflected in patient instructions       Healthy diet/nutrition       Vision screening       Osteoporosis prevention/bone health       Colorectal Cancer Screening      BMI:   Estimated body mass index is 28.74 kg/m  as calculated from the following:    Height as of this encounter: 1.473 m (4' 10\").    Weight as of this encounter: 62.4 kg (137 lb 8 oz).         She reports that she has quit smoking. She has been exposed to tobacco smoke. She has quit using smokeless tobacco.  Her smokeless tobacco use included chew.      Luc Mackey MD  [facility]  Answers for HPI/ROS submitted by the patient on 6/30/2023  If you checked off any problems, how difficult have these problems made it for you to do your work, take care of things at home, or get along with other people?: Somewhat " difficult  PHQ9 TOTAL SCORE: 5

## 2023-07-01 LAB — HCV AB SERPL QL IA: NONREACTIVE

## 2023-07-03 ENCOUNTER — ANCILLARY PROCEDURE (OUTPATIENT)
Dept: GENERAL RADIOLOGY | Facility: CLINIC | Age: 73
End: 2023-07-03
Attending: FAMILY MEDICINE
Payer: COMMERCIAL

## 2023-07-03 DIAGNOSIS — M79.661 PAIN OF RIGHT LOWER LEG: Primary | ICD-10-CM

## 2023-07-03 DIAGNOSIS — M79.661 PAIN OF RIGHT LOWER LEG: ICD-10-CM

## 2023-07-03 PROCEDURE — 73590 X-RAY EXAM OF LOWER LEG: CPT | Mod: TC | Performed by: RADIOLOGY

## 2023-07-03 PROCEDURE — 73562 X-RAY EXAM OF KNEE 3: CPT | Mod: TC | Performed by: RADIOLOGY

## 2023-07-27 NOTE — PROGRESS NOTES
"  Assessment & Plan     Tibia/fibula fracture, right, closed, initial encounter  I refilled her medication she understands that this medication can cause her to feel dizzy, we will be referring her to the orthopedics department  - oxyCODONE (ROXICODONE) 5 MG tablet; Take one tablet twice daily as needed   For pain  - Orthopedic  Referral; Future    Closed nondisplaced fracture of second metatarsal bone of right foot with delayed healing, subsequent encounter    She is to stay in the walking boot she can use naproxen for pain and oxycodone for breakthrough pain  - oxyCODONE (ROXICODONE) 5 MG tablet; Take one tablet twice daily as needed   For pain  - Orthopedic  Referral; Future    Drug-induced constipation    I informed the family taking oxycodone can worsen her constipation take a stool softener daily  - docusate sodium (COLACE) 100 MG capsule; Take 1 capsule (100 mg) by mouth 2 times daily    Closed nondisplaced fracture of third metatarsal bone of right foot with routine healing, subsequent encounter    Patient notes that she does have pain she is not taking any medication or if she moves her foot warned her not to place weight or take a walking boot    Closed nondisplaced fracture of fourth metatarsal bone of right foot with routine healing, subsequent encounter                   BMI:   Estimated body mass index is 27.38 kg/m  as calculated from the following:    Height as of 8/23/22: 1.473 m (4' 10\").    Weight as of 8/23/22: 59.4 kg (131 lb).           Return in about 4 weeks (around 3/15/2023) for Follow up.    Luc Mackey MD  [facility] YESSICA Quach is a 72 year old accompanied by her spouse and daughter, presenting for the following health issues:  Right ankle pain      72-year-old female here for right ankle pain she fell in the bathroom several days ago could not get up.  She then took an ambulance to [facility] where they did x-rays and told her that " she had a fracture.  They put her in a walking boot but have not arranged for any other visits for her.  Patient states that she is taking her naproxen but ran out of the other pain medication given at [facility].  Her  states that she has been complaining of pain during the day and has not been placing weight on her ankle..    History of Present Illness       Reason for visit:  Injury  Symptom onset:  3-7 days ago  Symptom intensity:  Moderate  Symptom progression:  Worsening  Had these symptoms before:  No    She eats 2-3 servings of fruits and vegetables daily.She consumes 2 sweetened beverage(s) daily.She exercises with enough effort to increase her heart rate 9 or less minutes per day.  She exercises with enough effort to increase her heart rate 3 or less days per week.   She is taking medications regularly.    Today's PHQ-9         PHQ-9 Total Score: 6    PHQ-9 Q9 Thoughts of better off dead/self-harm past 2 weeks :   Not at all    How difficult have these problems made it for you to do your work, take care of things at home, or get along with other people: Not difficult at all           Review of Systems   Constitutional, HEENT, cardiovascular, pulmonary, gi and gu systems are negative, except as otherwise noted.      Objective    /68 (BP Location: Right arm, Patient Position: Sitting, Cuff Size: Adult Regular)   Pulse 71   Temp 98.1  F (36.7  C) (Oral)   Resp 20   SpO2 98%   There is no height or weight on file to calculate BMI.  Physical Exam   GENERAL: healthy, alert and no distress  EYES: Eyes grossly normal to inspection, PERRL and conjunctivae and sclerae normal  NEURO: Normal strength and tone, mentation intact and speech normal  PSYCH: mentation appears normal, affect normal/bright                 3

## 2023-09-21 NOTE — PROGRESS NOTES
ASSESSMENT AND PLAN:  1. Chronic right shoulder pain  Right shoulder pain is increased because she is been doing naproxen.  Questions regarding clinic.  - naproxen (EC NAPROSYN) 500 MG EC tablet; Take 1 tablet (500 mg total) by mouth 2 (two) times a day with meals.  Dispense: 60 tablet; Refill: 4    2. Closed displaced fracture of surgical neck of right humerus with routine healing, unspecified fracture morphology, subsequent encounter  Patient's noticing more shoulder pain is radiates down into the arm because of lack of pain medication    3. Current moderate episode of major depressive disorder, unspecified whether recurrent (H)  Patient's been out of the Lexapro for only few days and has not noticed any symptoms of PTSD or nightmares.  Her mood remains normal.    4. Major depressive disorder with single episode, remission status unspecified    - escitalopram oxalate (LEXAPRO) 10 MG tablet; Take 1 tablet (10 mg total) by mouth daily.  Dispense: 30 tablet; Refill: 8    5. Slow transit constipation    - polyethylene glycol (GLYCOLAX) 17 gram/dose powder; Take 17 g by mouth daily.  Dispense: 1700 g; Refill: 6    6. Hyperlipidemia LDL goal <160    She is fasting checking a lipid panel BMP today.  - Lipid Cascade  - Basic Metabolic Panel            Orders Placed This Encounter   Procedures     Lipid Cascade     Order Specific Question:   Fasting is required?     Answer:   Yes     Basic Metabolic Panel     Medications Discontinued During This Encounter   Medication Reason     escitalopram oxalate (LEXAPRO) 10 MG tablet Reorder     polyethylene glycol (GLYCOLAX) 17 gram/dose powder Reorder     naproxen (EC NAPROSYN) 500 MG EC tablet Reorder       Return in about 3 months (around 11/2/2019) for Annual physical.    CHIEF COMPLAINT:  Chief Complaint   Patient presents with     Follow-up       HISTORY OF PRESENT ILLNESS:  Main is a 69 y.o. female with chronic right shoulder pain, chronic right leg pain related to previous  Left voicemail for patient to schedule labs.  Also sent message through the [employer].   "orthopedic injuries, major depression, and slow transit constipation, who presents to the clinic today for medication check. Mu is present with a Tracy . She states to have run out of all of her medications, possibly related to insurance coverage lapse. They are normally delivered. Her right shoulder and leg pain have not been controlled off of her medications. The patient has come in fasting today.     REVIEW OF SYSTEMS:   Musculoskeletal: Right shoulder and leg pain.    All other systems are negative.    PFSH:  She is . It is unclear if she currently has insurance coverage. She chews betel nut without tobacco. Reviewed as below.     TOBACCO USE:  Social History     Tobacco Use   Smoking Status Current Some Day Smoker   Smokeless Tobacco Current User     Types: Snuff, Chew   Tobacco Comment    betel nut w/o tobacco       VITALS:  Vitals:    08/02/19 1104   BP: 100/66   Pulse: 94   Resp: 18   Temp: 98  F (36.7  C)   TempSrc: Oral   SpO2: 96%   Weight: 131 lb 2 oz (59.5 kg)   Height: 4' 9.5\" (1.461 m)     Wt Readings from Last 3 Encounters:   08/02/19 131 lb 2 oz (59.5 kg)   04/18/19 133 lb 2 oz (60.4 kg)   12/18/18 134 lb 8 oz (61 kg)     Body mass index is 27.88 kg/m .      PHYSICAL EXAM:  General: Alert, cooperative, no distress, appears stated age  HEENT: Normocephalic, without obvious abnormality, atraumatic, moist mucous membranes  Eyes: PERRL, conjunctiva/cornea clear, EOM's intact  Lungs: Clear to auscultation bilaterally, respirations unlabored  Heart: Regular rate and rhythm, S1 and S2 normal, no murmur, rub, or gallop  Musculoskeletal: Tenderness to palpation radiating from the anterior portion of right deltoid into the right biceps  Neurologic:  A & O x 3.  No tremor, no focal findings.  Ambulates with cane assistance.    DATA REVIEWED:  Additional History from Old Records Summarized (2): none  Decision to Obtain Records (1): none  Radiology Tests Summarized or Ordered (1): none  Labs " Reviewed or Ordered (1): BMP and lipid cascade ordered.  Medicine Test Summarized or Ordered (1): none  Independent Review of EKG or X-RAY(2 each): none    Total of 25 minutes were spent in today's was spent discussing constipation shoulder pain and depression  IHanna, am scribing for and in the presence of, Dr. Mackey.    I, Dr. Mackey, personally performed the services described in this documentation, as scribed by Hanna Núñez in my presence, and it is both accurate and complete.      MEDICATIONS:  Current Outpatient Medications   Medication Sig Dispense Refill     acetaminophen (TYLENOL) 325 MG tablet Take 2 tablets (650 mg total) by mouth 3 (three) times a day. 100 tablet 2     escitalopram oxalate (LEXAPRO) 10 MG tablet Take 1 tablet (10 mg total) by mouth daily. 30 tablet 8     naproxen (EC NAPROSYN) 500 MG EC tablet Take 1 tablet (500 mg total) by mouth 2 (two) times a day with meals. 60 tablet 4     polyethylene glycol (GLYCOLAX) 17 gram/dose powder Take 17 g by mouth daily. 1700 g 6     No current facility-administered medications for this visit.        Total Data Points: 1    Please note that this clinical encounter uses voice recognition software, there may be typographical errors present

## 2023-10-25 ENCOUNTER — PATIENT OUTREACH (OUTPATIENT)
Dept: GERIATRIC MEDICINE | Facility: CLINIC | Age: 73
End: 2023-10-25
Payer: COMMERCIAL

## 2023-10-25 NOTE — PROGRESS NOTES
[facility] Care Coordination Contact      [facility] Six-Month Telephone Assessment    6 month telephone assessment completed on 10/25/23.    ER visits: No  Hospitalizations: No  TCU stays: No  Significant health status changes: no  Falls/Injuries: No  ADL/IADL changes: No  Changes in services: No    Caregiver Assessment follow up:  No falls since Feb 2023.  No ER or hospitalizations.  Reminded Mu of her appointment on 10/31/23 with Dr. Mackey.  Attempted to call personal care attendant, Rebecca Murillo, but did not  and could not leave message.      Goals: See POC in chart for goal progress documentation.      Will see member in 6 months for an annual health risk assessment.   Encouraged member to call CC with any questions or concerns in the meantime.     Luz Maria Pearce RN PHN LSW  [facility]  [phone]

## 2023-10-31 ENCOUNTER — LAB (OUTPATIENT)
Dept: FAMILY MEDICINE | Facility: CLINIC | Age: 73
End: 2023-10-31

## 2023-10-31 ENCOUNTER — OFFICE VISIT (OUTPATIENT)
Dept: FAMILY MEDICINE | Facility: CLINIC | Age: 73
End: 2023-10-31
Attending: FAMILY MEDICINE
Payer: COMMERCIAL

## 2023-10-31 VITALS
TEMPERATURE: 97.8 F | HEIGHT: 58 IN | BODY MASS INDEX: 29.07 KG/M2 | SYSTOLIC BLOOD PRESSURE: 123 MMHG | RESPIRATION RATE: 18 BRPM | DIASTOLIC BLOOD PRESSURE: 76 MMHG | WEIGHT: 138.5 LBS | OXYGEN SATURATION: 97 % | HEART RATE: 87 BPM

## 2023-10-31 DIAGNOSIS — Z12.11 SCREEN FOR COLON CANCER: ICD-10-CM

## 2023-10-31 DIAGNOSIS — F32.9 MAJOR DEPRESSIVE DISORDER WITH SINGLE EPISODE, REMISSION STATUS UNSPECIFIED: ICD-10-CM

## 2023-10-31 DIAGNOSIS — K59.03 DRUG-INDUCED CONSTIPATION: Primary | ICD-10-CM

## 2023-10-31 DIAGNOSIS — Z23 ENCOUNTER FOR IMMUNIZATION: ICD-10-CM

## 2023-10-31 DIAGNOSIS — M79.645 THUMB PAIN, LEFT: ICD-10-CM

## 2023-10-31 DIAGNOSIS — K59.01 SLOW TRANSIT CONSTIPATION: ICD-10-CM

## 2023-10-31 DIAGNOSIS — G89.29 CHRONIC RIGHT SHOULDER PAIN: ICD-10-CM

## 2023-10-31 DIAGNOSIS — M25.511 CHRONIC RIGHT SHOULDER PAIN: ICD-10-CM

## 2023-10-31 PROCEDURE — 90471 IMMUNIZATION ADMIN: CPT | Performed by: FAMILY MEDICINE

## 2023-10-31 PROCEDURE — 90662 IIV NO PRSV INCREASED AG IM: CPT | Performed by: FAMILY MEDICINE

## 2023-10-31 PROCEDURE — 99214 OFFICE O/P EST MOD 30 MIN: CPT | Mod: 25 | Performed by: FAMILY MEDICINE

## 2023-10-31 RX ORDER — DOCUSATE SODIUM 100 MG/1
100 CAPSULE, LIQUID FILLED ORAL 2 TIMES DAILY
Qty: 60 CAPSULE | Refills: 3 | Status: SHIPPED | OUTPATIENT
Start: 2023-10-31 | End: 2024-02-28

## 2023-10-31 RX ORDER — NAPROXEN 500 MG/1
TABLET ORAL
Qty: 60 TABLET | Refills: 11 | Status: SHIPPED | OUTPATIENT
Start: 2023-10-31 | End: 2024-06-26

## 2023-10-31 RX ORDER — POLYETHYLENE GLYCOL 3350 17 G/17G
17 POWDER, FOR SOLUTION ORAL DAILY
Qty: 1700 G | Refills: 6 | Status: SHIPPED | OUTPATIENT
Start: 2023-10-31 | End: 2024-02-28

## 2023-10-31 RX ORDER — ESCITALOPRAM OXALATE 10 MG/1
10 TABLET ORAL DAILY
Qty: 30 TABLET | Refills: 8 | Status: SHIPPED | OUTPATIENT
Start: 2023-10-31 | End: 2024-02-28

## 2023-10-31 ASSESSMENT — ANXIETY QUESTIONNAIRES
8. IF YOU CHECKED OFF ANY PROBLEMS, HOW DIFFICULT HAVE THESE MADE IT FOR YOU TO DO YOUR WORK, TAKE CARE OF THINGS AT HOME, OR GET ALONG WITH OTHER PEOPLE?: SOMEWHAT DIFFICULT
IF YOU CHECKED OFF ANY PROBLEMS ON THIS QUESTIONNAIRE, HOW DIFFICULT HAVE THESE PROBLEMS MADE IT FOR YOU TO DO YOUR WORK, TAKE CARE OF THINGS AT HOME, OR GET ALONG WITH OTHER PEOPLE: SOMEWHAT DIFFICULT
7. FEELING AFRAID AS IF SOMETHING AWFUL MIGHT HAPPEN: NOT AT ALL
2. NOT BEING ABLE TO STOP OR CONTROL WORRYING: NOT AT ALL
GAD7 TOTAL SCORE: 2
6. BECOMING EASILY ANNOYED OR IRRITABLE: SEVERAL DAYS
5. BEING SO RESTLESS THAT IT IS HARD TO SIT STILL: SEVERAL DAYS
3. WORRYING TOO MUCH ABOUT DIFFERENT THINGS: NOT AT ALL
1. FEELING NERVOUS, ANXIOUS, OR ON EDGE: NOT AT ALL
4. TROUBLE RELAXING: NOT AT ALL
7. FEELING AFRAID AS IF SOMETHING AWFUL MIGHT HAPPEN: NOT AT ALL
GAD7 TOTAL SCORE: 2

## 2023-10-31 NOTE — COMMUNITY RESOURCES LIST (ENGLISH)
10/31/2023   [facility] [employer]  N/A  For questions about this resource list or additional care needs, please contact your primary care clinic or care manager.  Phone: [phone]   Email: N/A   Address: [address] [place] [address]   Hours: N/A        Financial Stability       Utility payment assistance  1  [facility] Distance: 2.22 miles      Phone/Virtual   [address] [place] [address]  Language: English  Hours: Mon - Fri 9:00 AM - 4:00 PM  Fees: Free   Phone: (918) 897-8131 Email: [email or web address] Website: [email or web address] access hospital/[facility]/     2  [employer]) [facility] - Supportive Housing Assistance Program (SHAP) - Utility payment assistance Distance: 2.44 miles      Phone/Virtual   [address] [place] [address]  Language: English, Hmong, Gerald Molina  Hours: Mon - Fri 8:30 AM - 5:00 PM  Fees: Free   Phone: (291) 706-3246 Email: lefty@[email or web address] Website: http://www.hmong.org/hap-impact-areas/          Important Numbers & Websites       Emergency Services   911  City Services   311  Poison Control   (694) 433-8761  Suicide Prevention Lifeline   (157) 727-9631 (TALK)  Child Abuse Hotline   (289) 518-2103 (4-A-Child)  Sexual Assault Hotline   (743) 525-5811 (HOPE)  National Runaway Safeline   (332) 789-3990 (RUNAWAY)  All-Options Talkline   (654) 614-6619  Substance Abuse Referral   (987) 380-8374 (HELP)

## 2023-10-31 NOTE — PROGRESS NOTES
Assessment & Plan     Drug-induced constipation  She states that she is having 4 bowel movements per week she needs to be on 2 medications to prevent constipation I refilled the medicine for her today  - docusate sodium (COLACE) 100 MG capsule; Take 1 capsule (100 mg) by mouth 2 times daily    Major depressive disorder with single episode, remission status unspecified    Denies symptoms sleeping well she is interacting with her family her granddaughter states that she is not sad at home and talks and helps in the home frequently  - escitalopram (LEXAPRO) 10 MG tablet; Take 1 tablet (10 mg) by mouth daily    Screen for colon cancer    Patient agrees for Cologuard test today.  - COLOGUARD([employer]); Future    Slow transit constipation      - polyethylene glycol (MIRALAX) 17 GM/Dose powder; Take 17 g by mouth daily    Encounter for immunization    Patient agrees for flu shot she does not want a COVID-19 infection    Chronic right shoulder pain    Patient has been having chronic right shoulder pain secondary to a surgery for fracture that occurred years ago in [place] she has been out of her naproxen for several months she says the pain is worsening she is has difficulty elevating her right shoulder pain is not reproducible today have refilled the naproxen side effects discussed  - naproxen (EC-NAPROSYN) 500 MG EC tablet; [EC-NAPROXEN 500 MG EC TABLET] TAKE 1 TABLET (500 MG TOTAL) BY MOUTH 2 (TWO) TIMES A DAY WITH MEALS.    Thumb pain, left    Occasional tenderness noted over her left thumb no discernible masses.  No radiation of pain she denies any numbness in the left hand suggested using ice pack the naproxen should help             Nicotine/Tobacco Cessation:  She reports that she has been smoking cigarettes. She has been exposed to tobacco smoke. She has quit using smokeless tobacco.  Her smokeless tobacco use included chew.  Nicotine/Tobacco Cessation Plan:         BMI:   Estimated body mass index is 28.95  "kg/m  as calculated from the following:    Height as of this encounter: 1.473 m (4' 10\").    Weight as of this encounter: 62.8 kg (138 lb 8 oz).           MD LASHONDA Rivera [facility] YESSICA Quach is a 73 year old, presenting for the following health issues:  Depression      10/31/2023    10:32 AM   Additional Questions   Roomed by Demi GALLEGO   Accompanied by CHENTE Fernandez       History of Present Illness       Mental Health Follow-up:  Patient presents to follow-up on Anxiety.    Patient's anxiety since last visit has been:  Better  The patient is not having other symptoms associated with anxiety.  Any significant life events: No  Patient is not feeling anxious or having panic attacks.  Patient has no concerns about alcohol or drug use.        History of presenting illness continued    73-year-old female accompanied by her granddaughter here to follow-up on her constipation depression right shoulder pain and left thumb discomfort.  She reports she has been getting all her medications but she has been out of her pain medicine for some time she says her right shoulder hurts and she has noticed some tenderness in the left thumb has been present for several months she denies any injuries to the site.  She has no difficulty lifting objects in her left hand.  Her dad and daughter states that she is engaged at home and talks frequently and tries to help with cooking patient says she is sleeping well at night.  She has no nightmares.          Review of Systems   Constitutional, HEENT, cardiovascular, pulmonary, gi and gu systems are negative, except as otherwise noted.      Objective    /76   Pulse 87   Temp 97.8  F (36.6  C) (Oral)   Resp 18   Ht 1.473 m (4' 10\")   Wt 62.8 kg (138 lb 8 oz)   SpO2 97%   BMI 28.95 kg/m    Body mass index is 28.95 kg/m .  Physical Exam   GENERAL: healthy, alert and no distress  EYES: Eyes grossly normal to inspection, PERRL and conjunctivae and sclerae " normal  NECK: no adenopathy, no asymmetry, masses, or scars and thyroid normal to palpation  RESP: lungs clear to auscultation - no rales, rhonchi or wheezes  CV: regular rate and rhythm, normal S1 S2, no S3 or S4, no murmur, click or rub, no peripheral edema and peripheral pulses strong  ABDOMEN: soft, nontender, no hepatosplenomegaly, no masses and bowel sounds normal  MS: Elicited when I palpate the right anterior and posterior biceps.  She is tender over the medial aspect of the right deltoid muscle.  She is tender over the right supraspinatus.  No tenderness elicited with palpation of the first MTP joint of the left hand  SKIN: no suspicious lesions or rashes  NEURO: Normal strength and tone, mentation intact and speech normal  PSYCH: mentation appears normal, affect normal/bright

## 2023-11-24 ENCOUNTER — TELEPHONE (OUTPATIENT)
Dept: FAMILY MEDICINE | Facility: CLINIC | Age: 73
End: 2023-11-24
Payer: COMMERCIAL

## 2023-11-24 DIAGNOSIS — Z13.9 SCREENING FOR CONDITION: Primary | ICD-10-CM

## 2023-11-24 NOTE — TELEPHONE ENCOUNTER
CHENTE Dumont called to inform that patient accidentally threw away Cologuard kit, could they have another one delivered to the home in order to re-do test?   Order pended.

## 2023-12-14 ENCOUNTER — LAB (OUTPATIENT)
Dept: FAMILY MEDICINE | Facility: CLINIC | Age: 73
End: 2023-12-14
Payer: COMMERCIAL

## 2023-12-14 DIAGNOSIS — Z13.9 SCREENING FOR CONDITION: ICD-10-CM

## 2024-01-19 ENCOUNTER — ORDERS ONLY (AUTO-RELEASED) (OUTPATIENT)
Dept: FAMILY MEDICINE | Facility: CLINIC | Age: 74
End: 2024-01-19
Payer: COMMERCIAL

## 2024-01-19 DIAGNOSIS — Z13.9 SCREENING FOR CONDITION: ICD-10-CM

## 2024-01-19 NOTE — TELEPHONE ENCOUNTER
Patient still has not receive cologuard kit and has been waiting for 2 month. Dr. Mackey please put in another order for cologuard kit to be mail to patient home address.

## 2024-02-28 ENCOUNTER — OFFICE VISIT (OUTPATIENT)
Dept: FAMILY MEDICINE | Facility: CLINIC | Age: 74
End: 2024-02-28
Payer: COMMERCIAL

## 2024-02-28 VITALS
BODY MASS INDEX: 29.28 KG/M2 | HEART RATE: 77 BPM | WEIGHT: 139.5 LBS | HEIGHT: 58 IN | TEMPERATURE: 98 F | OXYGEN SATURATION: 96 % | DIASTOLIC BLOOD PRESSURE: 75 MMHG | RESPIRATION RATE: 16 BRPM | SYSTOLIC BLOOD PRESSURE: 125 MMHG

## 2024-02-28 DIAGNOSIS — K59.01 SLOW TRANSIT CONSTIPATION: ICD-10-CM

## 2024-02-28 DIAGNOSIS — G89.29 CHRONIC RIGHT SHOULDER PAIN: Primary | ICD-10-CM

## 2024-02-28 DIAGNOSIS — M25.511 CHRONIC RIGHT SHOULDER PAIN: Primary | ICD-10-CM

## 2024-02-28 DIAGNOSIS — K59.03 DRUG-INDUCED CONSTIPATION: ICD-10-CM

## 2024-02-28 DIAGNOSIS — F32.9 MAJOR DEPRESSIVE DISORDER WITH SINGLE EPISODE, REMISSION STATUS UNSPECIFIED: ICD-10-CM

## 2024-02-28 DIAGNOSIS — Z23 ENCOUNTER FOR IMMUNIZATION: ICD-10-CM

## 2024-02-28 PROCEDURE — 99214 OFFICE O/P EST MOD 30 MIN: CPT | Performed by: FAMILY MEDICINE

## 2024-02-28 RX ORDER — ESCITALOPRAM OXALATE 10 MG/1
10 TABLET ORAL DAILY
Qty: 30 TABLET | Refills: 8 | Status: SHIPPED | OUTPATIENT
Start: 2024-02-28

## 2024-02-28 RX ORDER — NAPROXEN 500 MG/1
500 TABLET ORAL 2 TIMES DAILY WITH MEALS
Qty: 60 TABLET | Refills: 9 | Status: SHIPPED | OUTPATIENT
Start: 2024-02-28

## 2024-02-28 RX ORDER — POLYETHYLENE GLYCOL 3350 17 G/17G
17 POWDER, FOR SOLUTION ORAL DAILY
Qty: 1700 G | Refills: 5 | Status: SHIPPED | OUTPATIENT
Start: 2024-02-28

## 2024-02-28 RX ORDER — DOCUSATE SODIUM 100 MG/1
100 CAPSULE, LIQUID FILLED ORAL 2 TIMES DAILY
Qty: 60 CAPSULE | Refills: 11 | Status: SHIPPED | OUTPATIENT
Start: 2024-02-28

## 2024-02-28 ASSESSMENT — PATIENT HEALTH QUESTIONNAIRE - PHQ9
SUM OF ALL RESPONSES TO PHQ QUESTIONS 1-9: 12
SUM OF ALL RESPONSES TO PHQ QUESTIONS 1-9: 12
10. IF YOU CHECKED OFF ANY PROBLEMS, HOW DIFFICULT HAVE THESE PROBLEMS MADE IT FOR YOU TO DO YOUR WORK, TAKE CARE OF THINGS AT HOME, OR GET ALONG WITH OTHER PEOPLE: SOMEWHAT DIFFICULT

## 2024-02-28 NOTE — PROGRESS NOTES
"  Assessment & Plan     Chronic right shoulder pain  Currently she has discomfort only with elevating her right shoulder beyond 40 degrees and internally rotating it.  She did not bring the Naprosyn today I will refill the medication side effects discussed  - naproxen (EC-NAPROSYN) 500 MG EC tablet; Take 1 tablet (500 mg) by mouth 2 times daily (with meals)    Slow transit constipation    Constipation noted for the last month she is run out of her MiraLAX  - polyethylene glycol (MIRALAX) 17 GM/Dose powder; Take 17 g by mouth daily    Major depressive disorder with single episode, remission status unspecified    Doing well at night approximately 8 hours.  Follow-up recommended in 4 to 6 months  - escitalopram (LEXAPRO) 10 MG tablet; Take 1 tablet (10 mg) by mouth daily    Encounter for immunization        Drug-induced constipation      - docusate sodium (COLACE) 100 MG capsule; Take 1 capsule (100 mg) by mouth 2 times daily            BMI  Estimated body mass index is 29.16 kg/m  as calculated from the following:    Height as of this encounter: 1.473 m (4' 10\").    Weight as of this encounter: 63.3 kg (139 lb 8 oz).   Will do a    Depression Screening Follow Up        2/28/2024    10:44 AM   PHQ   PHQ-9 Total Score 12   Q9: Thoughts of better off dead/self-harm past 2 weeks Several days   F/U: Thoughts of suicide or self-harm No   F/U: Safety concerns No                 Follow Up    Follow Up Actions Taken      Discussed the following ways the patient can remain in a safe environment:          Subjective   Mu is a 73 year old, presenting for the following health issues:  follow up  (Depression )      2/28/2024    10:46 AM   Additional Questions   Roomed by avril de paz   Accompanied by PCA     HPI 73-year-old female here for follow-up I have not seen her for several months she reports that she has been able to get her medications for her shoulder pain and depression she has been sleeping normally.  She denies any side " "effects from medication.  She did not bring them in for review today.    :[id number]}            Review of Systems  Constitutional, HEENT, cardiovascular, pulmonary, gi and gu systems are negative, except as otherwise noted.      Objective    /75 (BP Location: Left arm, Patient Position: Sitting, Cuff Size: Adult Regular)   Pulse 77   Temp 98  F (36.7  C) (Oral)   Resp 16   Ht 1.473 m (4' 10\")   Wt 63.3 kg (139 lb 8 oz)   SpO2 96%   BMI 29.16 kg/m    Body mass index is 29.16 kg/m .  Physical Exam   GENERAL: alert and no distress  EYES: Eyes grossly normal to inspection, PERRL and conjunctivae and sclerae normal  NECK: no adenopathy, no asymmetry, masses, or scars  RESP: lungs clear to auscultation - no rales, rhonchi or wheezes  CV: regular rate and rhythm, normal S1 S2, no S3 or S4, no murmur, click or rub, no peripheral edema  MS: no gross musculoskeletal defects noted, no edema unable to elevate right arm beyond 40 degrees externally rotating her right shoulder causes discomfort at 50 degrees  SKIN: no suspicious lesions or rashes  NEURO: Normal strength and tone, mentation intact and speech normal  PSYCH: mentation appears normal, affect normal/bright            Signed Electronically by: Luc Mackey MD    "

## 2024-03-01 ENCOUNTER — PATIENT OUTREACH (OUTPATIENT)
Dept: GERIATRIC MEDICINE | Facility: CLINIC | Age: 74
End: 2024-03-01
Payer: COMMERCIAL

## 2024-03-01 NOTE — PROGRESS NOTES
[facility] Care Coordination Contact    Called  Criselda Pickard  to schedule annual HRA home visit. HRA has been scheduled for 3/5/24 at 11am.    Luz Maria Pearce RN PHN [facility]  [phone]

## 2024-03-05 ENCOUNTER — PATIENT OUTREACH (OUTPATIENT)
Dept: GERIATRIC MEDICINE | Facility: CLINIC | Age: 74
End: 2024-03-05
Payer: COMMERCIAL

## 2024-03-05 ASSESSMENT — LIFESTYLE VARIABLES
AUDIT-C TOTAL SCORE: 0
SKIP TO QUESTIONS 9-10: 1

## 2024-03-05 ASSESSMENT — ACTIVITIES OF DAILY LIVING (ADL)
DEPENDENT_IADLS:: CLEANING;COOKING;LAUNDRY;SHOPPING;MEAL PREPARATION;TRANSPORTATION;MONEY MANAGEMENT;MEDICATION MANAGEMENT;INCONTINENCE

## 2024-03-05 NOTE — COMMUNITY RESOURCES LIST (ENGLISH)
03/05/2024   [facility] [employer]  N/A  For questions about this resource list or additional care needs, please contact your primary care clinic or care manager.  Phone: [phone]   Email: N/A   Address: [address] [place] [address]   Hours: N/A        Exercise and Recreation       Gym or workout facility  1  [facility] Distance: 1.17 miles      In-Person   [address] [place] [address]  Language: English  Hours: Mon - Thu 9:00 AM - 9:00 PM , Fri 9:00 AM - 6:00 PM  Fees: Free, Self Pay   Phone: (310) 616-3210 Email: [email or web address]. Website: [email or web address].[facility]/facilities/sebxv-ecwd-ciqouupelw-center     2  [facility] [place] [facility] Distance: 1.42 miles      In-Person   [address] [place] [address]  Language: English  Hours: Mon - Thu 2:00 PM - 9:00 PM , Fri 2:00 PM - 6:00 PM  Fees: Free, Self Pay   Phone: (783) 310-1152 Email: [email or web address]. Website: [email or web address]://www.[facility]/facilities/[facility]          Important Numbers & Websites       Emergency Services   911  [facility]   311  Poison Control   (770) 589-6883  Suicide Prevention Lifeline   (401) 915-7806 (TALK)  Child Abuse Hotline   (228) 642-6628 (4-A-Child)  Sexual Assault Hotline   (399) 473-6927 (HOPE)  National Runaway Safeline   (860) 716-9221 (RUNAWAY)  All-Options Talkline   (641) 794-4801  Substance Abuse Referral   (153) 348-4317 (HELP)

## 2024-03-07 NOTE — PROGRESS NOTES
[facility] Care Coordination Contact    [facility] Home Visit Assessment     Home visit for Health Risk Assessment with Main Morris completed on March 5, 2024    Type of residence:: Apartment  Current living arrangement:: I live in a private home with family, I live in a private home with spouse     Assessment completed with:: Patient, Friend, VM-chart review    Current Care Plan  Member currently receiving the following home care services:     Member currently receiving the following community resources: PCA      Medication Review  Medication reconciliation completed in Epic: Yes  Medication set-up & administration: Independent and sets up on own daily and Family/informal caregiver sets up daily.  Self-administers medications and Family caregiver administers medications.  Medication Risk Assessment Medication (1 or more, place referral to MTM): N/A: No risk factors identified  MTM Referral Placed: No: Offered MTM to learn more about her medications, but declines.      Mental/Behavioral Health   Depression Screening:   PHQ-2 Total Score (Adult) - Positive if 3 or more points; Administer PHQ-9 if positive: 2       Mental health DX:: Yes   Mental health DX how managed:: Medication, Other ([facility] worker)    Falls Assessment:   Fallen 2 or more times in the past year?: No   Any fall with injury in the past year?: No    ADL/IADL Dependencies:   Dependent ADLs:: Ambulation-walker, Toileting, Bathing, Transfers, Dressing, Grooming, Incontinence  Dependent IADLs:: Cleaning, Cooking, Laundry, Shopping, Meal Preparation, Transportation, Money Management, Medication Management, Incontinence    Health Plan sponsored benefits: [employer]: Shared information regarding preventative health screening and health plan supplemental benefits/incentives. Reviewed medication disposal form.    PCA Assessment completed at visit: Yes Annual PCA assessment indicated 4 hours per day of PCA. This is the same as the previous assessment.      Elderly Waiver Eligibility: Yes, but member declines EW services; will not open to EW    Care Plan & Recommendations: Main currently receives 4 hours/day of PCA services to assist in dressing, bathing, transfers, mobility, and toileting.  Mu declines MTM.  She reports she takes her medication as instructed.  Did not use pill box that was given last year. Family and friends continue to provide informal support such as paperwork, shopping, meal prepping, cleaning and transportation.  PCP currently manages Main's depression with medication. Also has an [facility] worker that visits her weekly.    See LTCC for detailed assessment information.    Follow-Up Plan: Member informed of future contact, plan to f/u with member with a 6 month telephone assessment.  Contact information shared with member and family, encouraged member to call with any questions or concerns at any time.    [place] care continuum providers: Please see Snapshot and Care Management Flowsheets for Specific details of care plan.    This CC note routed to PCP, Zoran Hugo.    Luz Maria Pearce RN PHN [facility]  [phone]

## 2024-03-14 ENCOUNTER — PATIENT OUTREACH (OUTPATIENT)
Dept: GERIATRIC MEDICINE | Facility: CLINIC | Age: 74
End: 2024-03-14
Payer: COMMERCIAL

## 2024-03-14 NOTE — PROGRESS NOTES
[facility] Care Coordination Contact    [facility]:  Emailed required PCA documents to [facility].  Faxed copy of PCA assessment to PCA Agency and mailed copy to member.  Faxed MD Communication to PCP.     Mela Bates  Care Management Specialist  [facility]  [phone]

## 2024-03-14 NOTE — LETTER
[facility]  [address], [address]  [place] [address]  Phone:  [phone]  Fax:  [phone]      March 14, 2024    MAIN BOOGIE  [address]  [place] [address]    Dear Main,    Enclosed is a copy of your completed PCA Assessment and Service Plan.  This is for your records and no action is required by you.  If you have additional questions regarding your assessment please contact me at [phone]. If you feel that your needs are not being met, please contact the Clinical Supervisor at [phone].    Sincerely,      Luz Maria Pearce RN  [phone]  [email or web address]        [employer]            Enclosure:  Completed PCA assessment

## 2024-03-26 ENCOUNTER — PATIENT OUTREACH (OUTPATIENT)
Dept: GERIATRIC MEDICINE | Facility: CLINIC | Age: 74
End: 2024-03-26
Payer: COMMERCIAL

## 2024-03-26 NOTE — PROGRESS NOTES
[facility] Care Coordination Contact    Received after visit chart from care coordinator.  Completed following tasks: Mailed copy of care plan/support plan to member, Mailed [place] Choices signature sheet pages 3-4, Mailed Safe Medication Disposal , and Updated services in Database      Mela Bates  Care Management Specialist  [facility]  [phone]

## 2024-03-26 NOTE — LETTER
March 26, 2024    MAIN BOOGIE  [address]  [place] [address]        Dear Main:    At [facility], we re dedicated to improving your health and wellness. Enclosed is the Care Plan developed with you on 03/05/2024. Please review the Care Plan carefully.    As a reminder, during your visit we talked about:  Ways to manage your physical and mental health  Using health care to maintain and improve your health   Your preventive care needs     Remember to contact your care coordinator if you:  Are hospitalized, or plan to be hospitalized   Have a fall    Have a change in your physical or mental health  Need help finding support or services    If you have questions, or don t agree with your Care Plan, call me at [phone]. You can also call me if your needs change. TTY users, call the [place] [employer] at (664) or 1-185.236.1826 (rbjuzm-vv-tzgjoe relay service).    Sincerely,          Luz Maria Pearce RN  [phone]  [email or web address]        [employer]    [id number] accepted    [id number] (07/2022)

## 2024-06-26 ENCOUNTER — OFFICE VISIT (OUTPATIENT)
Dept: FAMILY MEDICINE | Facility: CLINIC | Age: 74
End: 2024-06-26
Payer: COMMERCIAL

## 2024-06-26 ENCOUNTER — ORDERS ONLY (AUTO-RELEASED) (OUTPATIENT)
Dept: FAMILY MEDICINE | Facility: CLINIC | Age: 74
End: 2024-06-26

## 2024-06-26 VITALS
HEART RATE: 87 BPM | RESPIRATION RATE: 16 BRPM | OXYGEN SATURATION: 97 % | BODY MASS INDEX: 29.81 KG/M2 | WEIGHT: 142 LBS | TEMPERATURE: 98 F | SYSTOLIC BLOOD PRESSURE: 105 MMHG | DIASTOLIC BLOOD PRESSURE: 71 MMHG | HEIGHT: 58 IN

## 2024-06-26 DIAGNOSIS — Z12.11 COLON CANCER SCREENING: ICD-10-CM

## 2024-06-26 DIAGNOSIS — E78.2 MIXED HYPERLIPIDEMIA: ICD-10-CM

## 2024-06-26 DIAGNOSIS — H53.8 BLURRED VISION: Primary | ICD-10-CM

## 2024-06-26 DIAGNOSIS — H26.9 CATARACT OF BOTH EYES, UNSPECIFIED CATARACT TYPE: ICD-10-CM

## 2024-06-26 LAB
BASOPHILS # BLD AUTO: 0 10E3/UL (ref 0–0.2)
BASOPHILS NFR BLD AUTO: 0 %
EOSINOPHIL # BLD AUTO: 0.3 10E3/UL (ref 0–0.7)
EOSINOPHIL NFR BLD AUTO: 4 %
ERYTHROCYTE [DISTWIDTH] IN BLOOD BY AUTOMATED COUNT: 12.3 % (ref 10–15)
HCT VFR BLD AUTO: 42.7 % (ref 35–47)
HGB BLD-MCNC: 14.7 G/DL (ref 11.7–15.7)
IMM GRANULOCYTES # BLD: 0 10E3/UL
IMM GRANULOCYTES NFR BLD: 0 %
LYMPHOCYTES # BLD AUTO: 2.4 10E3/UL (ref 0.8–5.3)
LYMPHOCYTES NFR BLD AUTO: 33 %
MCH RBC QN AUTO: 32.4 PG (ref 26.5–33)
MCHC RBC AUTO-ENTMCNC: 34.4 G/DL (ref 31.5–36.5)
MCV RBC AUTO: 94 FL (ref 78–100)
MONOCYTES # BLD AUTO: 0.4 10E3/UL (ref 0–1.3)
MONOCYTES NFR BLD AUTO: 6 %
NEUTROPHILS # BLD AUTO: 4.2 10E3/UL (ref 1.6–8.3)
NEUTROPHILS NFR BLD AUTO: 57 %
PLATELET # BLD AUTO: 243 10E3/UL (ref 150–450)
RBC # BLD AUTO: 4.54 10E6/UL (ref 3.8–5.2)
WBC # BLD AUTO: 7.3 10E3/UL (ref 4–11)

## 2024-06-26 PROCEDURE — 36415 COLL VENOUS BLD VENIPUNCTURE: CPT | Performed by: FAMILY MEDICINE

## 2024-06-26 PROCEDURE — 85025 COMPLETE CBC W/AUTO DIFF WBC: CPT | Performed by: FAMILY MEDICINE

## 2024-06-26 PROCEDURE — 80061 LIPID PANEL: CPT | Performed by: FAMILY MEDICINE

## 2024-06-26 PROCEDURE — 80053 COMPREHEN METABOLIC PANEL: CPT | Performed by: FAMILY MEDICINE

## 2024-06-26 PROCEDURE — 82248 BILIRUBIN DIRECT: CPT | Performed by: FAMILY MEDICINE

## 2024-06-26 PROCEDURE — 84443 ASSAY THYROID STIM HORMONE: CPT | Performed by: FAMILY MEDICINE

## 2024-06-26 PROCEDURE — 99214 OFFICE O/P EST MOD 30 MIN: CPT | Performed by: FAMILY MEDICINE

## 2024-06-26 PROCEDURE — G2211 COMPLEX E/M VISIT ADD ON: HCPCS | Performed by: FAMILY MEDICINE

## 2024-06-26 ASSESSMENT — PATIENT HEALTH QUESTIONNAIRE - PHQ9
SUM OF ALL RESPONSES TO PHQ QUESTIONS 1-9: 2
10. IF YOU CHECKED OFF ANY PROBLEMS, HOW DIFFICULT HAVE THESE PROBLEMS MADE IT FOR YOU TO DO YOUR WORK, TAKE CARE OF THINGS AT HOME, OR GET ALONG WITH OTHER PEOPLE: NOT DIFFICULT AT ALL
SUM OF ALL RESPONSES TO PHQ QUESTIONS 1-9: 2

## 2024-06-26 NOTE — PATIENT INSTRUCTIONS
-Thank you for choosing the [facility].  -It was a pleasure to see you today.  -Please take a look at the information below for more specific details regarding the treatment plan and recommendations.  -In this after visit summary is a list of your medications and specific instructions.  Please review this carefully as there may be changes made to your medication list.  -If there are any particular questions or concerns, please feel free to reach out to Dr. Hugo.  -If any labs have been completed, we will reach out to you about results.  If the results are normal or not concerning, a letter or MyChart message will be sent to you.  If any follow-up is needed, either Dr. Hugo or the nurse will give you a call.  If you have not heard regarding results after 2 weeks, please reach out to the clinic.    Patient Instructions:    -You were referred to the eye doctor.   -If you do not hear from the specialist to schedule an appointment within a week's time from today, please call the [facility] and speak with the specialty  to help you schedule the appointment to see the specialist.  Depending on the specialist availability, it may be a number of weeks prior to your scheduled appointment.    -It is recommended for you to follow a nutrition plan rich in fruits and vegetables and low in fats and cholesterol to help maintain good cholesterol level.  No medications are recommended at this time.    -The stool test will be mailed out to your home.  -Collect the stool sample and return the sample to screen for colon cancer.      Please seek immediate medical attention (go to the emergency room or urgent care) for the following reasons: worsening symptoms, redness, swelling, fevers, chills, severe headaches, nausea, vomiting, or any concerning changes.      --------------------------------------------------------------------------------------------------------------------    -We are always looking  for ways to improve.  You may be selected to receive a survey regarding your visit today.  We encourage you to complete the survey and provide specific, constructive feedback to help us improve our processes.  Thank you for your time!  -Please review the contact information listed on the after visit summary and in the electronic chart.  Below is the phone number that we have on file.  If there are any changes that are needed to be made, please reach out to the clinic.  [phone] (home)

## 2024-06-26 NOTE — PROGRESS NOTES
OFFICE VISIT    Assessment/Plan:     Patient Instructions:    -You were referred to the eye doctor.   -If you do not hear from the specialist to schedule an appointment within a week's time from today, please call the [facility] and speak with the specialty  to help you schedule the appointment to see the specialist.  Depending on the specialist availability, it may be a number of weeks prior to your scheduled appointment.    -It is recommended for you to follow a nutrition plan rich in fruits and vegetables and low in fats and cholesterol to help maintain good cholesterol level.  No medications are recommended at this time.    -The stool test will be mailed out to your home.  -Collect the stool sample and return the sample to screen for colon cancer.      Please seek immediate medical attention (go to the emergency room or urgent care) for the following reasons: worsening symptoms, redness, swelling, fevers, chills, severe headaches, nausea, vomiting, or any concerning changes.      Emir was seen today for meet new provider  and eye problem.  Diagnoses and all orders for this visit:    Blurred vision  Cataract of both eyes, unspecified cataract type: Noted on examination.  Plan as below.  -     Adult Eye  Referral; Future    Mixed hyperlipidemia: Cholesterol continues to be elevated.  Prescription sent for atorvastatin 40 mg nightly.  Conservative management reviewed.  -     Basic metabolic panel; Future  -     CBC with Platelets & Differential; Future  -     Hepatic function panel; Future  -     Lipid panel reflex to direct LDL Fasting; Future  -     TSH with free T4 reflex; Future    Colon cancer screening  -     COLOGLIN([employer] SCI[employer]ES); Future        Return in about 4 months (around 10/26/2024) for Medicare Wellness Visit.    The diagnoses, treatment options, risk, benefits, and recommendations were reviewed with patient/guardian.  Questions were answered to patient's/guardian  satisfaction.  Red flag signs were reviewed.  Patient/guardian is in agreement with above plan.      Subjective: 74 year old female with history of presbyopia, depression, urinary incontinence (stress), chronic right shoulder pain, constipation, history of tibia and humeral fractures who presents to clinic for the following complaints:   Patient presents with:  meet new provider   Eye Problem: Blurry vision on both side    Answers submitted by the patient for this visit:  Patient Health Questionnaire (Submitted on 6/26/2024)  If you checked off any problems, how difficult have these problems made it for you to do your work, take care of things at home, or get along with other people?: Not difficult at all  PHQ9 TOTAL SCORE: 2  General Questionnaire (Submitted on 6/26/2024)  Chief Complaint: Chronic problems general questions HPI Form  What is the reason for your visit today? : meet new doctor  How many servings of fruits and vegetables do you eat daily?: 2-3  On average, how many sweetened beverages do you drink each day (Examples: soda, juice, sweet tea, etc.  Do NOT count diet or artificially sweetened beverages)?: 0  How many minutes a day do you exercise enough to make your heart beat faster?: 9 or less  How many days a week do you exercise enough to make your heart beat faster?: 3 or less  How many days per week do you miss taking your medication?: 0      Blurry vision: Noted on both sides.  The blurry vision started to become blurry about two years ago. The blurriness is gradual.     Denies any falls or injuries, redness, swelling, pain, fevers, chills, nausea, vomiting, severe headaches, or other changes from baseline.    Patient last saw the eye doctor on 3/21/2024 (Dr. Luan Mckeon) and diagnosed with presbyopia.  No other diagnoses listed, and the progress notes for the visit are not available for review at the visit today.    Hyperlipidemia: Patient due for recheck.    Presents with  and PCA.   A  "professional Tracy telephone  was utilized for the office visit.     The 10 point review of system is negative except as stated in the HPI.    Allergies were reviewed and updated.    HM due was reviewed with patient/parent.  Recommendations, risk, benefits were reviewed.  Accepted recommendations were ordered.  Otherwise, patient/parent declined.    Health Maintenance Due   Topic Date Due    DEXA  Never done    DEPRESSION ACTION PLAN  Never done    ZOSTER IMMUNIZATION (1 of 2) Never done    LUNG CANCER SCREENING  Never done    RSV VACCINE (Pregnancy & 60+) (1 - 1-dose 60+ series) Never done    COVID-19 Vaccine (5 - 2023-24 season) 09/01/2023    COLORECTAL CANCER SCREENING  10/08/2023    MEDICARE ANNUAL WELLNESS VISIT  06/30/2024    LIPID  06/30/2024         Immunization History   Administered Date(s) Administered    COVID-19 MONOVALENT 12+ (Pfizer) 05/18/2021, 06/08/2021    COVID-19 Monovalent 12+ (Pfizer 2022) 04/15/2022, 08/23/2022    Influenza (High Dose) 3 valent vaccine 11/08/2019    Influenza Vaccine 65+ (Fluzone HD) 09/08/2020, 10/31/2023    Influenza Vaccine, 6+MO IM (QUADRIVALENT W/PRESERVATIVES) 09/25/2018    Pneumo Conj 13-V (2010&after) 11/07/2017    Pneumococcal 23 valent 04/18/2019    TDAP (Adacel,Boostrix) 11/07/2017         Objective:   /71   Pulse 87   Temp 98  F (36.7  C) (Oral)   Resp 16   Ht 1.473 m (4' 10\")   Wt 64.4 kg (142 lb)   SpO2 97%   BMI 29.68 kg/m    General: Active, alert, nontoxic-appearing.  No acute distress.  HEENT: Normocephalic, atraumatic.  Pupils are equal and round.  Sclera is clear.  EOM intact.  Patient noted to have opacities in the lungs sound lines bilaterally.  Mild ptosis noted bilaterally.  Normal external ears. Nares patent.  Moist mucous membranes.    Cardiac: RRR.  S1, S2 present.  No murmurs, rubs, or gallops.  Respiratory/chest: Clear to auscultation bilaterally.  No wheezes, rales, rhonchi.  Breathing is not labored.  No accessory " muscle usage.  Extremities: Voluntary movements intact.  Integumentary: No concerning rash or skin changes appreciated.        Zoran Hugo MD  [facility] [place] [address]  Phone: [phone]  Fax: [phone]    6/27/2024  1:47 PM          Current Outpatient Medications   Medication Sig Dispense Refill    acetaminophen (TYLENOL) 325 MG tablet [ACETAMINOPHEN (TYLENOL) 325 MG TABLET] Take 2 tablets (650 mg total) by mouth 3 (three) times a day. 100 tablet 2    docusate sodium (COLACE) 100 MG capsule Take 1 capsule (100 mg) by mouth 2 times daily 60 capsule 11    escitalopram (LEXAPRO) 10 MG tablet Take 1 tablet (10 mg) by mouth daily 30 tablet 8    naproxen (EC-NAPROSYN) 500 MG EC tablet Take 1 tablet (500 mg) by mouth 2 times daily (with meals) 60 tablet 9    polyethylene glycol (MIRALAX) 17 GM/Dose powder Take 17 g by mouth daily 1700 g 5     No current facility-administered medications for this visit.       No Known Allergies    Patient Active Problem List    Diagnosis Date Noted    KUNAL (stress urinary incontinence, female) 11/08/2019     Priority: Medium    Slow transit constipation 09/25/2018     Priority: Medium    Right humeral fracture 03/07/2018     Priority: Medium    Fracture of distal end of tibia 03/07/2018     Priority: Medium    Depression 02/22/2018     Priority: Medium    Right shoulder pain 02/22/2018     Priority: Medium    Right leg pain 02/22/2018     Priority: Medium       Family History   Problem Relation Age of Onset    Breast Cancer No family hx of     Ovarian Cancer No family hx of        No past surgical history on file.     Social History     Socioeconomic History    Marital status:      Spouse name: Mere De La Torre    Number of children: 3    Years of education: Not on file    Highest education level: Not on file   Occupational History    Not on file   Tobacco Use    Smoking status: Some Days     Types: Cigarettes     Passive exposure: Past     Smokeless tobacco: Former     Types: Chew   Vaping Use    Vaping status: Never Used   Substance and Sexual Activity    Alcohol use: No    Drug use: No     Comment: Chews Betal nut 1x a day    Sexual activity: Not on file   Other Topics Concern    Not on file   Social History Narrative    Not on file     Social Determinants of Health     Financial Resource Strain: Low Risk  (6/26/2024)    Financial Resource Strain     Within the past 12 months, have you or your family members you live with been unable to get utilities (heat, electricity) when it was really needed?: No   Food Insecurity: Low Risk  (6/26/2024)    Food Insecurity     Within the past 12 months, did you worry that your food would run out before you got money to buy more?: No     Within the past 12 months, did the food you bought just not last and you didn t have money to get more?: No   Transportation Needs: Low Risk  (6/26/2024)    Transportation Needs     Within the past 12 months, has lack of transportation kept you from medical appointments, getting your medicines, non-medical meetings or appointments, work, or from getting things that you need?: No   Physical Activity: Inactive (3/5/2024)    Exercise Vital Sign     Days of Exercise per Week: 0 days     Minutes of Exercise per Session: 0 min   Stress: Stress Concern Present (3/5/2024)    [employer] - Occupational Stress Questionnaire     Feeling of Stress : Rather much   Social Connections: Not on file   Interpersonal Safety: Low Risk  (3/5/2024)    Interpersonal Safety     Do you feel physically and emotionally safe where you currently live?: Yes     Within the past 12 months, have you been hit, slapped, kicked or otherwise physically hurt by someone?: No     Within the past 12 months, have you been humiliated or emotionally abused in other ways by your partner or ex-partner?: No   Housing Stability: Low Risk  (6/26/2024)    Housing Stability     Do you have housing? :  Yes     Are you worried about losing your housing?: No

## 2024-06-27 LAB
ALBUMIN SERPL BCG-MCNC: 4.5 G/DL (ref 3.5–5.2)
ALP SERPL-CCNC: 95 U/L (ref 40–150)
ALT SERPL W P-5'-P-CCNC: 24 U/L (ref 0–50)
ANION GAP SERPL CALCULATED.3IONS-SCNC: 12 MMOL/L (ref 7–15)
AST SERPL W P-5'-P-CCNC: 22 U/L (ref 0–45)
BILIRUB DIRECT SERPL-MCNC: <0.2 MG/DL (ref 0–0.3)
BILIRUB SERPL-MCNC: 0.6 MG/DL
BUN SERPL-MCNC: 20.3 MG/DL (ref 8–23)
CALCIUM SERPL-MCNC: 9.5 MG/DL (ref 8.8–10.2)
CHLORIDE SERPL-SCNC: 103 MMOL/L (ref 98–107)
CHOLEST SERPL-MCNC: 260 MG/DL
CREAT SERPL-MCNC: 0.64 MG/DL (ref 0.51–0.95)
DEPRECATED HCO3 PLAS-SCNC: 26 MMOL/L (ref 22–29)
EGFRCR SERPLBLD CKD-EPI 2021: >90 ML/MIN/1.73M2
FASTING STATUS PATIENT QL REPORTED: YES
FASTING STATUS PATIENT QL REPORTED: YES
GLUCOSE SERPL-MCNC: 93 MG/DL (ref 70–99)
HDLC SERPL-MCNC: 54 MG/DL
LDLC SERPL CALC-MCNC: 160 MG/DL
NONHDLC SERPL-MCNC: 206 MG/DL
POTASSIUM SERPL-SCNC: 4.1 MMOL/L (ref 3.4–5.3)
PROT SERPL-MCNC: 7.5 G/DL (ref 6.4–8.3)
SODIUM SERPL-SCNC: 141 MMOL/L (ref 135–145)
TRIGL SERPL-MCNC: 229 MG/DL
TSH SERPL DL<=0.005 MIU/L-ACNC: 1.18 UIU/ML (ref 0.3–4.2)

## 2024-06-27 RX ORDER — ATORVASTATIN CALCIUM 40 MG/1
40 TABLET, FILM COATED ORAL AT BEDTIME
Qty: 90 TABLET | Refills: 3 | Status: SHIPPED | OUTPATIENT
Start: 2024-06-27

## 2024-06-28 ENCOUNTER — OFFICE VISIT (OUTPATIENT)
Dept: FAMILY MEDICINE | Facility: CLINIC | Age: 74
End: 2024-06-28
Payer: COMMERCIAL

## 2024-06-28 VITALS
OXYGEN SATURATION: 92 % | HEIGHT: 58 IN | WEIGHT: 144 LBS | HEART RATE: 105 BPM | DIASTOLIC BLOOD PRESSURE: 76 MMHG | BODY MASS INDEX: 30.23 KG/M2 | SYSTOLIC BLOOD PRESSURE: 119 MMHG | RESPIRATION RATE: 16 BRPM | TEMPERATURE: 98.1 F

## 2024-06-28 DIAGNOSIS — G89.29 CHRONIC RIGHT SHOULDER PAIN: ICD-10-CM

## 2024-06-28 DIAGNOSIS — M79.604 PAIN OF RIGHT LOWER EXTREMITY: ICD-10-CM

## 2024-06-28 DIAGNOSIS — F32.9 MAJOR DEPRESSIVE DISORDER WITH SINGLE EPISODE, REMISSION STATUS UNSPECIFIED: Primary | ICD-10-CM

## 2024-06-28 DIAGNOSIS — M25.511 CHRONIC RIGHT SHOULDER PAIN: ICD-10-CM

## 2024-06-28 PROCEDURE — 99214 OFFICE O/P EST MOD 30 MIN: CPT | Performed by: FAMILY MEDICINE

## 2024-06-28 ASSESSMENT — PATIENT HEALTH QUESTIONNAIRE - PHQ9
10. IF YOU CHECKED OFF ANY PROBLEMS, HOW DIFFICULT HAVE THESE PROBLEMS MADE IT FOR YOU TO DO YOUR WORK, TAKE CARE OF THINGS AT HOME, OR GET ALONG WITH OTHER PEOPLE: SOMEWHAT DIFFICULT
SUM OF ALL RESPONSES TO PHQ QUESTIONS 1-9: 14
SUM OF ALL RESPONSES TO PHQ QUESTIONS 1-9: 14
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
## 2024-08-20 ENCOUNTER — OFFICE VISIT (OUTPATIENT)
Dept: FAMILY MEDICINE | Facility: CLINIC | Age: 74
End: 2024-08-20
Payer: COMMERCIAL

## 2024-08-20 VITALS
TEMPERATURE: 97.6 F | RESPIRATION RATE: 20 BRPM | HEART RATE: 82 BPM | SYSTOLIC BLOOD PRESSURE: 107 MMHG | BODY MASS INDEX: 30.01 KG/M2 | OXYGEN SATURATION: 98 % | WEIGHT: 139.08 LBS | DIASTOLIC BLOOD PRESSURE: 72 MMHG | HEIGHT: 57 IN

## 2024-08-20 DIAGNOSIS — H26.9 CATARACT OF BOTH EYES, UNSPECIFIED CATARACT TYPE: ICD-10-CM

## 2024-08-20 DIAGNOSIS — Z01.818 PRE-OP EXAM: Primary | ICD-10-CM

## 2024-08-20 DIAGNOSIS — H53.8 BLURRED VISION: ICD-10-CM

## 2024-08-20 PROBLEM — S82.309A FRACTURE OF DISTAL END OF TIBIA: Status: RESOLVED | Noted: 2018-03-07 | Resolved: 2024-08-20

## 2024-08-20 PROCEDURE — 99214 OFFICE O/P EST MOD 30 MIN: CPT | Performed by: FAMILY MEDICINE

## 2024-08-20 ASSESSMENT — PATIENT HEALTH QUESTIONNAIRE - PHQ9
SUM OF ALL RESPONSES TO PHQ QUESTIONS 1-9: 8
SUM OF ALL RESPONSES TO PHQ QUESTIONS 1-9: 8
10. IF YOU CHECKED OFF ANY PROBLEMS, HOW DIFFICULT HAVE THESE PROBLEMS MADE IT FOR YOU TO DO YOUR WORK, TAKE CARE OF THINGS AT HOME, OR GET ALONG WITH OTHER PEOPLE: SOMEWHAT DIFFICULT

## 2024-08-20 NOTE — PATIENT INSTRUCTIONS
-Thank you for choosing the [facility].  -It was a pleasure to see you today.  -Please take a look at the information below for more specific details regarding the treatment plan and recommendations.  -At the end of this after visit summary is a list of your medications and specific instructions.  Please review this carefully as there may be changes made to your medication list.  -If there are any particular questions or concerns, please feel free to reach out to Dr. Hugo.  -If any labs have been completed, we will reach out to you about results.  If the results are normal or not concerning, a letter or MyChart message will be sent to you.  If any follow-up is needed, either Dr. Hugo or the nurse will give you a call.  If you have not heard regarding results after 2 weeks, please reach out to the clinic.    Patient is cleared for the procedure at this time.    Emir was seen today for pre-op exam and refill request.  Diagnoses and all orders for this visit:    Pre-op exam  Cataract of both eyes, unspecified cataract type  Blurred vision    -Bring the preoperative evaluation printout with you to the surgery.   -The care coordination team will contact you to help get the medications you need for the surgery.    Patient Instructions:  -You are cleared for surgery at this time.   -Limit/avoid usage of NSAIDs (such as ibuprofen/naproxen) within 4 days of the procedure.  -It is okay to use acetaminophen/Tylenol as needed up to the procedure.  -Otherwise, continue your prescribed medications as usual on the morning of the procedure. Take these medications with just sips of water, just enough for the medications to go down.  -Do not eat or drink anything after midnight the night before the procedure.   -Follow any instructions given to you from your surgeon.  If there is any conflicting recommendations, please follow the recommendations given to you from your surgery team.    -Dr. Hugo and his team  "will send the preoperative clearance note to the surgery team once completed.     Please seek immediate medical attention (go to the emergency room or urgent care) for the following reasons: flu like symptoms, feeling ill, or any concerning changes.    Please return to clinic as needed.      Crisis Information and Contact Numbers:     Mental Health Care  [facility] ([facility])  [address] [place]  Suicidal: [phone]  Consultation: [phone]  24/7 Crisis Intervention Center  COPE ([facility] for Psychiatric Emergencies)  [phone]  Crisis Text Line  Text \"[place]\" to 967275  Crisis Connection 24/7  1-963.257.9018  National Suicide Prevention Hotline  4-656-125-VILY or 988  Walk-in Counseling Center  550.953.9102  [address]  Hours: Mon, Wed, Fri 1-3pm; Mon-Thurs 6:30-8:30pm    [facility] Children's Crisis Line: [phone]    [facility] Adult Crisis Line: 291.643.8122.     National Helpline: 1-502.956.4506 (HELP)  (National, 24/7; Treatment referral and information)    211.org ([employer]): 211 (Assistance locating long-term mental health resources, talking through a problem, or exploring mental health treatment options. 180 languages available).     The Joe Lifeline: 1-775.351.7999  (National; LGBTQ Youth)  The Trans Lifeline: 1-706.954.6194  (National; Trans mental health)  [facility] Crisis Line: 1-125.884.6439 (TALK) (364111 (text #); National)  Crisis Text Line: 214692   (National)       [facility]  [address] [place] [address]  [facility]   [phone].  Serves [facility], and [facility]  They have faster access to urgent psychiatry and crisis stablization.    Hours   Monday - Friday: 8:00 am - 7:00 pm   Saturday: 11:00 am - 3:00 pm   Sunday and Holidays: Closed  Walk-ins Welcome    Who should visit?   Any adult (age 18 and older) who:   > Needs immediate mental health support   > Is not " experiencing a medical emergency    Cost  There is a fee for Urgent Care Services. They bill insurance providers when available and offer a variety of payment options including an income-based sliding fee scale.      --------------------------------------------------------------------------------------------------------------------    -We are always looking for ways to improve.  You may be selected to receive a survey regarding your visit today.  We encourage you to complete the survey and provide specific, constructive feedback to help us improve our processes.  Thank you for your time!  -Please review the contact information listed on the after visit summary and in the electronic chart.  Below is the phone number that we have on file.  If there are any changes that are needed to be made, please reach out to the clinic.  [phone] (home)
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
## 2024-09-24 ENCOUNTER — PATIENT OUTREACH (OUTPATIENT)
Dept: GERIATRIC MEDICINE | Facility: CLINIC | Age: 74
End: 2024-09-24
Payer: COMMERCIAL

## 2024-09-24 NOTE — PROGRESS NOTES
[facility] Care Coordination Contact      [facility] Six-Month Telephone Assessment    6 month telephone assessment completed on 09/24/24.    ER visits: No  Hospitalizations: No  TCU stays: No  Significant health status changes: no changes  Falls/Injuries: No  ADL/IADL changes: No  Changes in services: No    Caregiver Assessment follow up:  Mu reports no falls, ER visits or hospitalizations.  Schedule annual wellness on 10/24/24.  Reports her vision has improved since getting cataract surgery.  CC called [facility] to start diapers and chucks as she has not received any.  Reports her chronic pain has stayed the same at 5/10.  Will take prescribed medications for discomfort.       Goals: See Support Plan for goal progress documentation.      Will see member in 6 months for an annual health risk assessment.   Encouraged member to call CC with any questions or concerns in the meantime.     Luz Maria Pearce RN PHN [facility]  [phone]

## 2024-09-30 ENCOUNTER — MEDICAL CORRESPONDENCE (OUTPATIENT)
Dept: HEALTH INFORMATION MANAGEMENT | Facility: CLINIC | Age: 74
End: 2024-09-30
Payer: COMMERCIAL

## 2024-10-05 ENCOUNTER — HEALTH MAINTENANCE LETTER (OUTPATIENT)
Age: 74
End: 2024-10-05

## 2024-10-08 ENCOUNTER — TELEPHONE (OUTPATIENT)
Dept: FAMILY MEDICINE | Facility: CLINIC | Age: 74
End: 2024-10-08
Payer: COMMERCIAL

## 2024-10-08 NOTE — TELEPHONE ENCOUNTER
Forms/Letter Request    Type of form/letter: DME (wheelchair, hospital bed)    Type of DME requested: 60 QTY PROC. CODE  PULL UP MEDIUM 34-46 20/PK PREVAIL    Do we have the form/letter: Yes: Faxed    Who is the form from? [facility] incoming fax (if other please explain)    Where did/will the form come from? form was faxed in    When is form/letter needed by: ASAP    How would you like the form/letter returned: Fax : [phone]

## 2024-10-08 NOTE — TELEPHONE ENCOUNTER
Forms/Letter Request    Type of form/letter: DME     Type of DME requested: Bed underpads prevail 23x36 lrg 15/pk    Do we have the form/letter: Yes: Incoming rightfax bin for Dr. BROOKE    Who is the form from? [employer] (if other please explain)    Where did/will the form come from? form was faxed in    When is form/letter needed by: asap    How would you like the form/letter returned: Fax : [phone]

## 2024-10-14 ENCOUNTER — MEDICAL CORRESPONDENCE (OUTPATIENT)
Dept: HEALTH INFORMATION MANAGEMENT | Facility: CLINIC | Age: 74
End: 2024-10-14
Payer: COMMERCIAL

## 2024-10-24 NOTE — TELEPHONE ENCOUNTER
Patient c/o rt knee pain with tingling sensation for 3 weeks , denies any injury . Received duplicate request.  Writer retrieved form from saved rightfax bin, re-printed and refaxed to [phone].

## 2024-10-28 ENCOUNTER — ANCILLARY PROCEDURE (OUTPATIENT)
Dept: MAMMOGRAPHY | Facility: CLINIC | Age: 74
End: 2024-10-28
Attending: FAMILY MEDICINE
Payer: COMMERCIAL

## 2024-10-28 ENCOUNTER — OFFICE VISIT (OUTPATIENT)
Dept: FAMILY MEDICINE | Facility: CLINIC | Age: 74
End: 2024-10-28
Payer: COMMERCIAL

## 2024-10-28 ENCOUNTER — ORDERS ONLY (AUTO-RELEASED) (OUTPATIENT)
Dept: FAMILY MEDICINE | Facility: CLINIC | Age: 74
End: 2024-10-28

## 2024-10-28 VITALS
OXYGEN SATURATION: 97 % | RESPIRATION RATE: 16 BRPM | TEMPERATURE: 99.2 F | DIASTOLIC BLOOD PRESSURE: 62 MMHG | WEIGHT: 143.8 LBS | SYSTOLIC BLOOD PRESSURE: 122 MMHG | HEIGHT: 57 IN | HEART RATE: 72 BPM | BODY MASS INDEX: 31.02 KG/M2

## 2024-10-28 DIAGNOSIS — Z12.11 COLON CANCER SCREENING: ICD-10-CM

## 2024-10-28 DIAGNOSIS — Z12.31 VISIT FOR SCREENING MAMMOGRAM: ICD-10-CM

## 2024-10-28 DIAGNOSIS — Z00.00 ROUTINE GENERAL MEDICAL EXAMINATION AT A HEALTH CARE FACILITY: Primary | ICD-10-CM

## 2024-10-28 DIAGNOSIS — R09.89 CHEST CONGESTION: ICD-10-CM

## 2024-10-28 DIAGNOSIS — Z13.820 SCREENING FOR OSTEOPOROSIS: ICD-10-CM

## 2024-10-28 DIAGNOSIS — R05.3 CHRONIC COUGH: ICD-10-CM

## 2024-10-28 DIAGNOSIS — Z23 NEED FOR VACCINATION: ICD-10-CM

## 2024-10-28 DIAGNOSIS — F32.9 MAJOR DEPRESSIVE DISORDER WITH SINGLE EPISODE, REMISSION STATUS UNSPECIFIED: ICD-10-CM

## 2024-10-28 DIAGNOSIS — H26.9 CATARACT OF BOTH EYES, UNSPECIFIED CATARACT TYPE: ICD-10-CM

## 2024-10-28 DIAGNOSIS — E78.2 MIXED HYPERLIPIDEMIA: ICD-10-CM

## 2024-10-28 PROCEDURE — 36415 COLL VENOUS BLD VENIPUNCTURE: CPT | Performed by: FAMILY MEDICINE

## 2024-10-28 PROCEDURE — 80061 LIPID PANEL: CPT | Performed by: FAMILY MEDICINE

## 2024-10-28 PROCEDURE — 99214 OFFICE O/P EST MOD 30 MIN: CPT | Mod: 25 | Performed by: FAMILY MEDICINE

## 2024-10-28 PROCEDURE — 77067 SCR MAMMO BI INCL CAD: CPT | Mod: TC | Performed by: STUDENT IN AN ORGANIZED HEALTH CARE EDUCATION/TRAINING PROGRAM

## 2024-10-28 PROCEDURE — 99397 PER PM REEVAL EST PAT 65+ YR: CPT | Performed by: FAMILY MEDICINE

## 2024-10-28 PROCEDURE — T1013 SIGN LANG/ORAL INTERPRETER: HCPCS

## 2024-10-28 RX ORDER — PREDNISONE 50 MG/1
50 TABLET ORAL DAILY
Qty: 5 TABLET | Refills: 0 | Status: SHIPPED | OUTPATIENT
Start: 2024-10-28 | End: 2024-11-02

## 2024-10-28 RX ORDER — ESCITALOPRAM OXALATE 10 MG/1
10 TABLET ORAL DAILY
Qty: 30 TABLET | Refills: 11 | Status: SHIPPED | OUTPATIENT
Start: 2024-10-28

## 2024-10-28 RX ORDER — ALBUTEROL SULFATE 90 UG/1
1-2 INHALANT RESPIRATORY (INHALATION) EVERY 4 HOURS PRN
Qty: 17 G | Refills: 11 | Status: SHIPPED | OUTPATIENT
Start: 2024-10-28

## 2024-10-28 SDOH — HEALTH STABILITY: PHYSICAL HEALTH: ON AVERAGE, HOW MANY DAYS PER WEEK DO YOU ENGAGE IN MODERATE TO STRENUOUS EXERCISE (LIKE A BRISK WALK)?: 0 DAYS

## 2024-10-28 SDOH — HEALTH STABILITY: PHYSICAL HEALTH: ON AVERAGE, HOW MANY MINUTES DO YOU ENGAGE IN EXERCISE AT THIS LEVEL?: 0 MIN

## 2024-10-28 ASSESSMENT — PATIENT HEALTH QUESTIONNAIRE - PHQ9
SUM OF ALL RESPONSES TO PHQ QUESTIONS 1-9: 8
10. IF YOU CHECKED OFF ANY PROBLEMS, HOW DIFFICULT HAVE THESE PROBLEMS MADE IT FOR YOU TO DO YOUR WORK, TAKE CARE OF THINGS AT HOME, OR GET ALONG WITH OTHER PEOPLE: SOMEWHAT DIFFICULT
SUM OF ALL RESPONSES TO PHQ QUESTIONS 1-9: 8

## 2024-10-28 NOTE — PATIENT INSTRUCTIONS
-Thank you for choosing the [facility].  -It was a pleasure to see you today.  -Please take a look at the information below for more specific details regarding the treatment plan and recommendations.  -In this after visit summary is a list of your medications and specific instructions.  Please review this carefully as there may be changes made to your medication list.  -If there are any particular questions or concerns, please feel free to reach out to Dr. Hugo.  -If any labs have been completed, we will reach out to you about results.  If the results are normal or not concerning, a letter or MyChart message will be sent to you.  If any follow-up is needed, either Dr. Hugo or the nurse will give you a call.  If you have not heard regarding results after 2 weeks, please reach out to the clinic.    Patient Instructions:      -You are doing great.  -Continue to eat well.  Try to increase your servings of calcium as this can help your bones stay strong and healthy.  Follow a nutrition plan rich in fruits and vegetables and low in fats and cholesterol.    -Be sure to eat 5-7 servings of fruits and vegetables each day.  -Find ways to stay active.  Try to get 150 minutes of moderate activity (where you are breathing faster and slightly sweating) each week.  -Try to maintain a body mass index (BMI) of 18.5-25 as this is considered a healthier weight range.  -Brush your teeth twice daily.  See a dentist every 6-12 months.  -Be sure to use sunblock with SPF 15 or greater when going outside for extended periods of time.  Sunblock should be used even when it is a cloudy day.  Do intermittent skin checks for any concerning skin changes.  Wearing a wide brimmed hat and sunglasses can also be helpful to protect your skin from the sun.  -Monitor for any abnormal skin changes (such as new moles/spots, painful moles, changes in your old moles, wounds that will not heal, multiple colors noted in one lesion,  lesions that are asymmetric or not circular, or anything that is concerning for you). If any of these are noted, please schedule an appointment to be seen.     -It is generally recommended for you to complete a health care directive or living will. These documents will be able to reflect your wishes and desire in the case that you are unable to express them yourself. Please let Dr. Hugo know if you would like some assistance with this process.    -For the preventive health procedure (such as colonoscopy, mammogram, etc) order from today, if you do not hear within a week's time from the specialist to schedule an appointment, please call the [facility] and speak with the specialty  to help you schedule the appointment. Depending on the specialist availability, it may be a number of weeks prior to your scheduled appointment.    -The Shingles/Shingrix vaccine is generally better covered by insurance companies if the vaccine is received at a pharmacy.  Please speak with your pharmacist regarding this vaccination as it is recommended for you.    -Mixing a spoonful of honey and warm water and drinking that throughout the day can be helpful to reduce the cough.  -Use lozenges/cough drops as needed. Cepacol is a good option.   -Do salt water gurgles as needed.   -It is recommended that you stay well-hydrated to help you recover.  Try to drink 64 ounces of water each day.  -Eat a balanced diet.  -Coughs can last an average of 2-3 weeks due to the presence of drainage in the back of the throat resulting in irritation.  The cough will improve gradually over this timeframe.  Seek medical attention if the cough persists or worsens.  -You may take acetaminophen/Tylenol (up to 1000 mg once every 8 hours as needed) and ibuprofen/Advil (up to 600 mg once every 6 hours as needed) to help with any fevers and discomforts.  Avoid chronic long-term usage of these medications.    -Return to clinic 3-4 weeks for a nurse only  appointment to update vaccinations.     Please seek immediate medical attention (go to the emergency room or urgent care) for the following reasons: worsening symptoms, no improvement after 4-5 days, fever/chills, worsening cough, shortness of breath, chest pain, dizziness, lightheadedness, confusion, feeling unwell, or any concerning changes.      --------------------------------------------------------------------------------------------------------------------    -We are always looking for ways to improve.  You may be selected to receive a survey regarding your visit today.  We encourage you to complete the survey and provide specific, constructive feedback to help us improve our processes.  Thank you for your time!  -Please review the contact information listed on the after visit summary and in the electronic chart.  Below is the phone number that we have on file.  If there are any changes that are needed to be made, please reach out to the clinic.  [phone] ([place])     Patient Education   You have been provided the Preventive Care Advice document.    Additional copies can be found here: [email or web address]  Preventive Care Advice   This is general advice given by our system to help you stay healthy. However, your care team may have specific advice just for you. Please talk to your care team about your preventive care needs.  Nutrition  Eat 5 or more servings of fruits and vegetables each day.  Try wheat bread, brown rice and whole grain pasta (instead of white bread, rice, and pasta).  Get enough calcium and vitamin D. Check the label on foods and aim for 100% of the RDA (recommended daily allowance).  Lifestyle  Exercise at least 150 minutes each week  (30 minutes a day, 5 days a week).  Do muscle strengthening activities 2 days a week. These help control your weight and prevent disease.  No smoking.  Wear sunscreen to prevent skin cancer.  Have a dental exam and cleaning every 6 months.  Yearly  exams  See your health care team every year to talk about:  Any changes in your health.  Any medicines your care team has prescribed.  Preventive care, family planning, and ways to prevent chronic diseases.  Shots (vaccines)   HPV shots (up to age 26), if you've never had them before.  Hepatitis B shots (up to age 59), if you've never had them before.  COVID-19 shot: Get this shot when it's due.  Flu shot: Get a flu shot every year.  Tetanus shot: Get a tetanus shot every 10 years.  Pneumococcal, hepatitis A, and RSV shots: Ask your care team if you need these based on your risk.  Shingles shot (for age 50 and up)  General health tests  Diabetes screening:  Starting at age 35, Get screened for diabetes at least every 3 years.  If you are younger than age 35, ask your care team if you should be screened for diabetes.  Cholesterol test: At age 39, start having a cholesterol test every 5 years, or more often if advised.  Bone density scan (DEXA): At age 50, ask your care team if you should have this scan for osteoporosis (brittle bones).  Hepatitis C: Get tested at least once in your life.  STIs (sexually transmitted infections)  Before age 24: Ask your care team if you should be screened for STIs.  After age 24: Get screened for STIs if you're at risk. You are at risk for STIs (including HIV) if:  You are sexually active with more than one person.  You don't use condoms every time.  You or a partner was diagnosed with a sexually transmitted infection.  If you are at risk for HIV, ask about PrEP medicine to prevent HIV.  Get tested for HIV at least once in your life, whether you are at risk for HIV or not.  Cancer screening tests  Cervical cancer screening: If you have a cervix, begin getting regular cervical cancer screening tests starting at age 21.  Breast cancer scan (mammogram): If you've ever had breasts, begin having regular mammograms starting at age 40. This is a scan to check for breast cancer.  Colon cancer  screening: It is important to start screening for colon cancer at age 45.  Have a colonoscopy test every 10 years (or more often if you're at risk) Or, ask your provider about stool tests like a FIT test every year or Cologuard test every 3 years.  To learn more about your testing options, visit:   .  For help making a decision, visit:   https://bit.ly/ef12853.  Prostate cancer screening test: If you have a prostate, ask your care team if a prostate cancer screening test (PSA) at age 55 is right for you.  Lung cancer screening: If you are a current or former smoker ages 50 to 80, ask your care team if ongoing lung cancer screenings are right for you.  For informational purposes only. Not to replace the advice of your health care provider. Copyright   2023 [employer]. All rights reserved. Clinically reviewed by the [facility] Transitions Program. [employer] [id number] - REV 01/24.
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
## 2024-10-29 LAB
CHOLEST SERPL-MCNC: 242 MG/DL
FASTING STATUS PATIENT QL REPORTED: YES
HDLC SERPL-MCNC: 53 MG/DL
LDLC SERPL CALC-MCNC: 162 MG/DL
NONHDLC SERPL-MCNC: 189 MG/DL
TRIGL SERPL-MCNC: 134 MG/DL

## 2024-10-31 ENCOUNTER — TELEPHONE (OUTPATIENT)
Dept: FAMILY MEDICINE | Facility: CLINIC | Age: 74
End: 2024-10-31
Payer: COMMERCIAL

## 2024-10-31 NOTE — TELEPHONE ENCOUNTER
----- Message from Zoran Hugo sent at 10/31/2024  7:21 AM CDT -----  Team - please call patient with results.    Ara Morris,    I hope you have been well since our last visit. Below are the results from the testing completed at the visit.     The cholesterol test shows that you are not consistently taking the cholesterol medication. Please take the atorvastatin every night consistently going forward.     The other labs are in process and we will reach out to you once those are completed.    Dr. Hugo recommends that you continue on the plan as discussed in clinic.    If there are any questions or concerns, please call the clinic or schedule an appointment for follow up.     Best wishes,           Zoran Hugo MD  [facility]  10/31/2024  7:18 AM

## 2024-10-31 NOTE — LETTER
"November 5, 2024      Emir Morris  [address]  [place] [address]        Dear ,    We are writing to inform you of your test results.    Test results indicate you may require additional follow up, see comment below from Dr. Hugo.       \"I hope you have been well since our last visit. Below are the results from the testing completed at the visit.      The cholesterol test shows that you are not consistently taking the cholesterol medication. Please take the atorvastatin every night consistently going forward.      The other labs are in process and we will reach out to you once those are completed.     Dr. Hugo recommends that you continue on the plan as discussed in clinic.     If there are any questions or concerns, please call the clinic or schedule an appointment for follow up.\"      Resulted Orders   Lipid panel reflex to direct LDL Fasting   Result Value Ref Range    Cholesterol 242 (H) <200 mg/dL    Triglycerides 134 <150 mg/dL    Direct Measure HDL 53 >=50 mg/dL    LDL Cholesterol Calculated 162 (H) <100 mg/dL    Non HDL Cholesterol 189 (H) <130 mg/dL    Patient Fasting > 8hrs? Yes     Narrative    Cholesterol  Desirable: < 200 mg/dL  Borderline High: 200 - 239 mg/dL  High: >= 240 mg/dL    Triglycerides  Normal: < 150 mg/dL  Borderline High: 150 - 199 mg/dL  High: 200-499 mg/dL  Very High: >= 500 mg/dL    Direct Measure HDL  Female: >= 50 mg/dL   Male: >= 40 mg/dL    LDL Cholesterol  Desirable: < 100 mg/dL  Above Desirable: 100 - 129 mg/dL   Borderline High: 130 - 159 mg/dL   High:  160 - 189 mg/dL   Very High: >= 190 mg/dL    Non HDL Cholesterol  Desirable: < 130 mg/dL  Above Desirable: 130 - 159 mg/dL  Borderline High: 160 - 189 mg/dL  High: 190 - 219 mg/dL  Very High: >= 220 mg/dL       If you have any questions or concerns, please call the clinic at the number listed above.       Sincerely,      [facility]   Nurse Team   "

## 2024-10-31 NOTE — TELEPHONE ENCOUNTER
"Writer attempt #1 to call patient with the help of a \"Tracy\"   regarding clinician's message below. No answer, left non-detailed voicemail, with clinic call back number.     If patient / family calls back, please relay clinician's message to them. Thanks.    Jean Pierre Fontaine, GWENDOLYNN, RN, PHN   [facility]    "

## 2024-11-01 NOTE — TELEPHONE ENCOUNTER
"Writer attempt #2 to call patient with the help of a \"Tracy\"   regarding clinician's message below. No answer, left non-detailed voicemail, with clinic call back number.      If patient / family calls back, please relay clinician's message to them. Thanks.    Jean Pierre Fontaine, GWENDOLYNN, RN, PHN   [facility]       "

## 2024-11-05 NOTE — TELEPHONE ENCOUNTER
3rd attempt---Called patient, unable to reach patient, left voicemail. Please relay provider message if patient calls back. Thank you.     3 unsuccessful attempts in reaching patient by phone, encounter closed, and communication letter sent.       Gloria Nichols, MSN, RN   [facility]

## 2024-11-18 LAB — NONINV COLON CA DNA+OCC BLD SCRN STL QL: NEGATIVE

## 2025-02-10 ENCOUNTER — PATIENT OUTREACH (OUTPATIENT)
Dept: GERIATRIC MEDICINE | Facility: CLINIC | Age: 75
End: 2025-02-10
Payer: COMMERCIAL

## 2025-02-10 NOTE — PROGRESS NOTES
[facility] Care Coordination Contact    Called  Haleigh [facility]  to schedule annual HRA home visit. HRA has been scheduled for 02/19/25 at 10am.    Luz Maria Pearce RN PHN [facility]  [phone]

## 2025-02-19 ENCOUNTER — PATIENT OUTREACH (OUTPATIENT)
Dept: GERIATRIC MEDICINE | Facility: CLINIC | Age: 75
End: 2025-02-19
Payer: COMMERCIAL

## 2025-02-19 ASSESSMENT — LIFESTYLE VARIABLES
SKIP TO QUESTIONS 9-10: 1
AUDIT-C TOTAL SCORE: 0

## 2025-02-26 ENCOUNTER — PATIENT OUTREACH (OUTPATIENT)
Dept: GERIATRIC MEDICINE | Facility: CLINIC | Age: 75
End: 2025-02-26
Payer: COMMERCIAL

## 2025-02-26 NOTE — LETTER
February 26, 2025       MAIN BOOGIE  [address]  [place] [address]      Dear Main,    At [facility], we re dedicated to improving your health and wellness. Enclosed is the Support Plan developed with you on 02/19/2025. Please review the Support Plan carefully.    As a reminder, during your visit we talked about:   Ways to manage your physical and mental health   Using health care to maintain and improve your health    Your preventive care needs      Remember to contact your care coordinator if you:   Are hospitalized or plan to be hospitalized    Have a fall     Have a change in your physical or mental health   Need help finding support or services    If you have questions or don t agree with your Support Plan, call me at [phone]. You can also call me if your needs change. TTY users call the [place] [employer] at 026 or 1-933.835.3216 (zlfmpu-ot-ztavzl relay service).    Sincerely,         Luz Maria Pearce RN  [phone]  [email or web address]        [facility]                [id number] accepted     (06/2024)                [address] [place] [address]  [phone]  fax [phone]  [facility]
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
## 2025-02-26 NOTE — PROGRESS NOTES
[facility] Care Coordination Contact    Received after visit chart from care coordinator.  Completed following tasks: Mailed copy of support plan to member, Mailed [place] signature sheet pages 3-4, Mailed Safe Medication Disposal , Mailed Transition of Care Member Handout, and Updated services in Database.  , Provider Signature - No Support Plan Shared:  Member indicates that they do not want their support plan shared with any EW providers.    [facility]:  Emailed required CFSS documents to [employer].  Mailed member supplemental summary chart and assessment summary letter.     Mela Bates  Care Management Specialist  [facility]  [phone]

## 2025-05-23 ENCOUNTER — OFFICE VISIT (OUTPATIENT)
Dept: FAMILY MEDICINE | Facility: CLINIC | Age: 75
End: 2025-05-23
Payer: COMMERCIAL

## 2025-05-23 VITALS
WEIGHT: 147 LBS | HEART RATE: 89 BPM | DIASTOLIC BLOOD PRESSURE: 69 MMHG | TEMPERATURE: 98.9 F | OXYGEN SATURATION: 98 % | HEIGHT: 57 IN | RESPIRATION RATE: 20 BRPM | BODY MASS INDEX: 31.71 KG/M2 | SYSTOLIC BLOOD PRESSURE: 105 MMHG

## 2025-05-23 DIAGNOSIS — Z78.0 POSTMENOPAUSAL STATUS: ICD-10-CM

## 2025-05-23 DIAGNOSIS — R09.89 ABNORMAL LUNG SOUNDS: Primary | ICD-10-CM

## 2025-05-23 DIAGNOSIS — F33.1 MODERATE RECURRENT MAJOR DEPRESSION (H): ICD-10-CM

## 2025-05-23 DIAGNOSIS — Z87.891 PERSONAL HISTORY OF TOBACCO USE: ICD-10-CM

## 2025-05-23 DIAGNOSIS — E78.2 MIXED HYPERLIPIDEMIA: ICD-10-CM

## 2025-05-23 PROCEDURE — 3074F SYST BP LT 130 MM HG: CPT | Performed by: FAMILY MEDICINE

## 2025-05-23 PROCEDURE — 99214 OFFICE O/P EST MOD 30 MIN: CPT | Performed by: FAMILY MEDICINE

## 2025-05-23 PROCEDURE — 3078F DIAST BP <80 MM HG: CPT | Performed by: FAMILY MEDICINE

## 2025-05-23 PROCEDURE — G0296 VISIT TO DETERM LDCT ELIG: HCPCS | Performed by: FAMILY MEDICINE

## 2025-05-23 RX ORDER — ATORVASTATIN CALCIUM 40 MG/1
40 TABLET, FILM COATED ORAL AT BEDTIME
Qty: 90 TABLET | Refills: 3 | Status: SHIPPED | OUTPATIENT
Start: 2025-05-23

## 2025-05-23 ASSESSMENT — PATIENT HEALTH QUESTIONNAIRE - PHQ9
SUM OF ALL RESPONSES TO PHQ QUESTIONS 1-9: 1
SUM OF ALL RESPONSES TO PHQ QUESTIONS 1-9: 1

## 2025-05-23 NOTE — PROGRESS NOTES
Assessment & Plan     Abnormal lung sounds:  - Noted new finding of crackles on lung exam today; no recent chest X-ray since 2017.  - Order chest X-ray for next week to investigate lung sounds (unable to be done in clinic today because no radiology tech available)..  - Educated patient on the importance of regular lung health monitoring due to personal history of tobacco use.    Mixed hyperlipidemia:  - Records reviewed: Last lipid profile was done on 10/28/2024 with LDL of 162.  It seems that she has been out of the atorvastatin for at least a month or 2, so we will refill that and then plan to recheck labs at next visit in about 6 months.  It had been as high as 201 in the past.  - Educated patient on the importance of medication adherence to manage cholesterol levels effectively.  -     atorvastatin (LIPITOR) 40 MG tablet; Take 1 tablet (40 mg) by mouth at bedtime.    Personal history of tobacco use:  - History of smoking, qualifies for lung cancer screening.  - Order CT scan for lung cancer screening; recommend annual CT scan for lung cancer screening.  - Discussed the benefits of smoking cessation and regular screenings for lung health.  -     Prof fee: Shared Decision Making for Lung Cancer Screening  -     CT Chest Lung Cancer Scrn Low Dose wo; Future    Postmenopausal status:  - Order bone density scan for osteoporosis screening.  - Educated patient on the importance of bone health and regular screenings.  -     DEXA HIP/PELVIS/SPINE - Future; Future    Moderate recurrent major depression (H):  - Continue current medication for mood management as it appears effective, with PHQ-9 score today of 1.  - Discussed the importance of regular follow-ups to monitor mood and medication effectiveness.    Consent was obtained from the patient to use an AI documentation tool in the creation of this note.          The longitudinal plan of care for the diagnosis(es)/condition(s) as documented were addressed during this  visit. Due to the added complexity in care, I will continue to support Emir in the subsequent management and with ongoing continuity of care.          Subjective   Emir is a 75 year old, presenting for the following health issues:  Establish Care        5/23/2025     2:37 PM   Additional Questions   Roomed by Jadon TAY   Accompanied by Grand daughter         History of Present Illness       Reason for visit:  Follow Up With Doctor    She eats 4 or more servings of fruits and vegetables daily.She consumes 1 sweetened beverage(s) daily.She exercises with enough effort to increase her heart rate 9 or less minutes per day.  She exercises with enough effort to increase her heart rate 3 or less days per week. She is missing 2 dose(s) of medications per week.  She is not taking prescribed medications regularly due to remembering to take.   Emir Morris, 75 years, visit to establish care and for follow-up on mood, cholesterol, and lung health.    Mood and depression management:  Reports that mood has been down in the past, but currently feels okay with no depression. There is a medication for mood or depression on the list, which seems to be working well.    Cholesterol management:  There is a medication for cholesterol on the list, but it appears that the refills have run out. The last refill was sent almost exactly a year ago.    Breathing difficulties:  Albuterol inhaler noted to be on her medication list.  Upon inquiry, she reports that sometimes experiences difficulty breathing but does not have an inhaler at home. In the past, specifically around October 28th, there was a prescription for an inhaler due to a chronic cough and chest congestion, with wheezing noted at that time. It is unclear if this was due to illness or another cause. Since then, breathing has been fine.    Misc:  No diabetes or high blood pressure. Sensitive eyes due to cataracts. Has a history of smoking, sometimes smoking two cigarettes a day, starting  "from a young age.                  Objective    /69 (BP Location: Left arm)   Pulse 89   Temp 98.9  F (37.2  C) (Oral)   Resp 20   Ht 1.455 m (4' 9.28\")   Wt 66.7 kg (147 lb)   SpO2 98%   BMI 31.50 kg/m    Body mass index is 31.5 kg/m .  Physical Exam   Gen:  A&A, NAD  CV:  HRRR, no M/R/G  Resp: Bilateral crackles right more than left  Ext:  W&D, no edema             Signed Electronically by: Leora Pedromo MD        Lung Cancer Screening Shared Decision Making Visit     Main Morris, a 75 year old female, is eligible for lung cancer screening    History   Smoking Status    Some Days    Types: Cigarettes   Smokeless Tobacco    Former    Types: Chew       I have discussed with patient the risks and benefits of screening for lung cancer with low-dose CT.     The risks include:    radiation exposure: one low dose chest CT has as much ionizing radiation as about 15 chest x-rays, or 6 months of background radiation living in [place]      false positives: most findings/nodules are NOT cancer, but some might still require additional diagnostic evaluation, including biopsy    over-diagnosis: some slow growing cancers that might never have been clinically significant will be detected and treated unnecessarily     The benefit of early detection of lung cancer is contingent upon adherence to annual screening or more frequent follow up if indicated.     Furthermore, to benefit from screening, Main must be willing and able to undergo diagnostic procedures, if indicated. Although no specific guide is available for determining severity of comorbidities, it is reasonable to withhold screening in patients who have greater mortality risk from other diseases.     We did discuss that the best way to prevent lung cancer is to not smoke.    Some patients may value a numeric estimation of lung cancer risk when evaluating if lung cancer screening is right for them, here is one calculator:    ShouldIScreen  "

## 2025-05-23 NOTE — LETTER
My Depression Action Plan  Name: Main Morris   Date of Birth 1950  Date: 5/23/2025    My doctor: Leora Perdomo   My clinic: [facility] [address] [place] [id number] [phone]            GREEN    ZONE   Good Control    What it looks like:   Things are going generally well. You have normal ups and downs. You may even feel depressed from time to time, but bad moods usually last less than a day.   What you need to do:  Continue to care for yourself (see self care plan)  Check your depression survival kit and update it as needed  Follow your physician s recommendations including any medication.  Do not stop taking medication unless you consult with your physician first.             YELLOW         ZONE Getting Worse    What it looks like:   Depression is starting to interfere with your life.   It may be hard to get out of bed; you may be starting to isolate yourself from others.  Symptoms of depression are starting to last most all day and this has happened for several days.   You may have suicidal thoughts but they are not constant.   What you need to do:     Call your care team. Your response to treatment will improve if you keep your care team informed of your progress. Yellow periods are signs an adjustment may need to be made.     Continue your self-care.  Just get dressed and ready for the day.  Don't give yourself time to talk yourself out of it.    Talk to someone in your support network.    Open up your Depression Self-Care Plan/Wellness Kit.             RED    ZONE Medical Alert - Get Help    What it looks like:   Depression is seriously interfering with your life.   You may experience these or other symptoms: You can t get out of bed most days, can t work or engage in other necessary activities, you have trouble taking care of basic hygiene, or basic responsibilities, thoughts of suicide or death that will not go away, self-injurious behavior.      What you need to do:  Call your care team and request a same-day appointment. If they are not available (weekends or after hours) call your local crisis line, emergency room or 911.          Depression Self-Care Plan / Wellness Kit    Many people find that medication and therapy are helpful treatments for managing depression. In addition, making small changes to your everyday life can help to boost your mood and improve your wellbeing. Below are some tips for you to consider. Be sure to talk with your medical provider and/or behavioral health consultant if your symptoms are worsening or not improving.     Sleep   Sleep hygiene  means all of the habits that support good, restful sleep. It includes maintaining a consistent bedtime and wake time, using your bedroom only for sleeping or sex, and keeping the bedroom dark and free of distractions like a computer, smartphone, or television.     Develop a Healthy Routine  Maintain good hygiene. Get out of bed in the morning, make your bed, brush your teeth, take a shower, and get dressed. Don t spend too much time viewing media that makes you feel stressed. Find time to relax each day.    Exercise  Get some form of exercise every day. This will help reduce pain and release endorphins, the  feel good  chemicals in your brain. It can be as simple as just going for a walk or doing some gardening, anything that will get you moving.      Diet  Strive to eat healthy foods, including fruits and vegetables. Drink plenty of water. Avoid excessive sugar, caffeine, alcohol, and other mood-altering substances.     Stay Connected with Others  Stay in touch with friends and family members.    Manage Your Mood  Try deep breathing, massage therapy, biofeedback, or meditation. Take part in fun activities when you can. Try to find something to smile about each day.     Psychotherapy  Be open to working with a therapist if your provider recommends it.     Medication  Be sure to take your  medication as prescribed. Most anti-depressants need to be taken every day. It usually takes several weeks for medications to work. Not all medicines work for all people. It is important to follow-up with your provider to make sure you have a treatment plan that is working for you. Do not stop your medication abruptly without first discussing it with your provider.    Crisis Resources   These hotlines are for both adults and children. They and are open 24 hours a day, 7 days a week unless noted otherwise.    National Suicide Prevention Lifeline   988 or 2-230-315-MQRU (0529)    Crisis Text Line    www.crisistextline.org  Text HOME to 477740 from anywhere in the [place], anytime, about any type of crisis. A live, trained crisis counselor will receive the text and respond quickly.    [facility] Lifeline for LGBTQ Youth  A national crisis intervention and suicide lifeline for LGBTQ youth under 25. Provides a safe place to talk without judgement. Call 1-978.326.8653; text START to 755328 or visit www.theGigalocalvorproject.org to talk to a trained counselor.    For [facility] crisis numbers, visit the [facility] website at:  https://[place].gov/dhs/people-we-serve/adults/health-care/mental-health/resources/crisis-contacts.jsp

## 2025-05-23 NOTE — PROGRESS NOTES
"  {PROVIDER CHARTING PREFERENCE:683926}    Johnny Field is a 75 year old, presenting for the following health issues:  [facility] Care      5/23/2025     2:37 PM   Additional Questions   Roomed by Jadon TAY   Accompanied by Grand daughter     History of Present Illness       Reason for visit:  Follow Up With Doctor    She eats 4 or more servings of fruits and vegetables daily.She consumes 1 sweetened beverage(s) daily.She exercises with enough effort to increase her heart rate 9 or less minutes per day.  She exercises with enough effort to increase her heart rate 3 or less days per week. She is missing 2 dose(s) of medications per week.  She is not taking prescribed medications regularly due to remembering to take.        {MA/LPN/RN Pre-Provider Visit Orders- hCG/UA/Strep (Optional):357605}  {SUPERLIST (Optional):815223}  {additonal problems for provider to add (Optional):484448}    {ROS Picklists (Optional):436883}      Objective    /69 (BP Location: Left arm)   Pulse 89   Temp 98.9  F (37.2  C) (Oral)   Resp 20   Ht 1.455 m (4' 9.28\")   Wt 66.7 kg (147 lb)   SpO2 98%   BMI 31.50 kg/m    Body mass index is 31.5 kg/m .  Physical Exam   {Exam List (Optional):698884}    {Diagnostic Test Results (Optional):721613}        Signed Electronically by: Leora Perdomo MD  {Email feedback regarding this note to [email or web address]   :074997}  "

## 2025-05-23 NOTE — PATIENT INSTRUCTIONS
Lung Cancer Screening   Frequently Asked Questions  If you are at high-risk for lung cancer, getting screened with low-dose computed tomography (LDCT) every year can help save your life. This handout offers answers to some of the most common questions about lung cancer screening. If you have other questions, please call [phone] ([phone]).     What is it?  Lung cancer screening uses special X-ray technology to create an image of your lung tissue. The exam is quick and easy and takes less than 10 seconds. We don t give you any medicine or use any needles. You can eat before and after the exam. You don t need to change your clothes as long as the clothing on your chest doesn t contain metal. But, you do need to be able to hold your breath for at least 6 seconds during the exam.    What is the goal of lung cancer screening?  The goal of lung cancer screening is to save lives. Many times, lung cancer is not found until a person starts having physical symptoms. Lung cancer screening can help detect lung cancer in the earliest stages when it may be easier to treat.    Who should be screened for lung cancer?  We suggest lung cancer screening for anyone who is at high-risk for lung cancer. You are in the high-risk group if you:      are between the ages of 55 and 79, and    have smoked at least 1 pack of cigarettes a day for 20 or more years, and    still smoke or have quit within the past 15 years.    However, if you have a new cough or shortness of breath, you should talk to your doctor before being screened.    Why does it matter if I have symptoms?  Certain symptoms can be a sign that you have a condition in your lungs that should be checked and treated by your doctor. These symptoms include fever, chest pain, a new or changing cough, shortness of breath that you have never felt before, coughing up blood or unexplained weight loss. Having any of these symptoms can greatly affect the results of lung  cancer screening.       Should all smokers get an LDCT lung cancer screening exam?  It depends. Lung cancer screening is for a very specific group of men and women who have a history of heavy smoking over a long period of time (see  Who should be screened for lung cancer  above).  I am in the high-risk group, but have been diagnosed with cancer in the past. Is LDCT lung cancer screening right for me?  In some cases, you should not have LDCT lung screening, such as when your doctor is already following your cancer with CT scan studies. Your doctor will help you decide if LDCT lung screening is right for you.  Do I need to have a screening exam every year?  Yes. If you are in the high-risk group described earlier, you should get an LDCT lung cancer screening exam every year until you are 79, or are no longer willing or able to undergo screening and possible procedures to diagnose and treat lung cancer.  How effective is LDCT at preventing death from lung cancer?  Studies have shown that LDCT lung cancer screening can lower the risk of death from lung cancer by 20 percent in people who are at high-risk.  What are the risks?  There are some risks and limitations of LDCT lung cancer screening. We want to make sure you understand the risks and benefits, so please let us know if you have any questions. Your doctor may want to talk with you more about these risks.    Radiation exposure: As with any exam that uses radiation, there is a very small increased risk of cancer. The amount of radiation in LDCT is small--about the same amount a person would get from a mammogram. Your doctor orders the exam when he or she feels the potential benefits outweigh the risks.    False negatives: No test is perfect, including LDCT. It is possible that you may have a medical condition, including lung cancer, that is not found during your exam. This is called a false negative result.    False positives and more testing: LDCT very often finds  something in the lung that could be cancer, but in fact is not. This is called a false positive result. False positive tests often cause anxiety. To make sure these findings are not cancer, you may need to have more tests. These tests will be done only if you give us permission. Sometimes patients need a treatment that can have side effects, such as a biopsy. For more information on false positives, see  What can I expect from the results?     Findings not related to lung cancer: Your LDCT exam also takes pictures of areas of your body next to your lungs. In a very small number of cases, the CT scan will show an abnormal finding in one of these areas, such as your kidneys, adrenal glands, liver or thyroid. This finding may not be serious, but you may need more tests. Your doctor can help you decide what other tests you may need, if any.  What can I expect from the results?  About 1 out of 4 LDCT exams will find something that may need more tests. Most of the time, these findings are lung nodules. Lung nodules are very small collections of tissue in the lung. These nodules are very common, and the vast majority--more than 97 percent--are not cancer (benign). Most are normal lymph nodes or small areas of scarring from past infections.  But, if a small lung nodule is found to be cancer, the cancer can be cured more than 90 percent of the time. To know if the nodule is cancer, we may need to get more images before your next yearly screening exam. If the nodule has suspicious features (for example, it is large, has an odd shape or grows over time), we will refer you to a specialist for further testing.  Will my doctor also get the results?  Yes. Your doctor will get a copy of your results.  Is it okay to keep smoking now that there s a cancer screening exam?  No. Tobacco is one of the strongest cancer-causing agents. It causes not only lung cancer, but other cancers and cardiovascular (heart) diseases as well. The damage  caused by smoking builds over time. This means that the longer you smoke, the higher your risk of disease. While it is never too late to quit, the sooner you quit, the better.  Where can I find help to quit smoking?  The best way to prevent lung cancer is to stop smoking. If you have already quit smoking, congratulations and keep it up! For help on quitting smoking, please call [employer] at 1-218-QUITNOW (1-773.923.5980) or the [employer] at 1-687.199.6162 to find local resources near you.  One-on-one health coaching:  If you d prefer to work individually with a health care provider on tobacco cessation, we offer:      Medication Therapy Management:  Our specially trained pharmacists work closely with you and your doctor to help you quit smoking.  Call [phone] or [phone] (toll free).

## 2025-05-28 ENCOUNTER — ANCILLARY PROCEDURE (OUTPATIENT)
Dept: GENERAL RADIOLOGY | Facility: CLINIC | Age: 75
End: 2025-05-28
Attending: FAMILY MEDICINE
Payer: COMMERCIAL

## 2025-05-28 DIAGNOSIS — R09.89 ABNORMAL LUNG SOUNDS: ICD-10-CM

## 2025-05-28 PROCEDURE — 71046 X-RAY EXAM CHEST 2 VIEWS: CPT | Mod: TC | Performed by: RADIOLOGY

## 2025-05-29 ENCOUNTER — RESULTS FOLLOW-UP (OUTPATIENT)
Dept: FAMILY MEDICINE | Facility: CLINIC | Age: 75
End: 2025-05-29

## 2025-05-29 DIAGNOSIS — R93.89 ABNORMAL CHEST X-RAY: ICD-10-CM

## 2025-05-29 DIAGNOSIS — R91.8 LUNG FIELD ABNORMAL FINDING ON EXAMINATION: Primary | ICD-10-CM

## 2025-06-02 NOTE — TELEPHONE ENCOUNTER
Leora Perdomo MD to [facility] (Selected Message)      5/29/25  5:04 PM  Result Note  Please let pt know that her chest xray showed some possible scarring.  I recommend getting a lung CT and seeing a pulmonologist.  I'll put in referrals for both.  I'm actually going to cancel the other kind of lung CT that I ordered, because she needs a different kind for this finding.       Called PCA/patient and spouse on the phone with a Tracy , Ser ID: [id number].   Message also left for daughter, Kimani Way to contact the clinic as well for further questions.  Spoke to new PCA (Chantell Schaefer) with patient verbal permission for RN to relay test result.  Test result above provided.  Advised to call back to the clinic by end of this week if not heard from referral for appointment assistance.  Clinic number provided.    Ronald Whelan RN   [facility]

## 2025-06-06 ENCOUNTER — TELEPHONE (OUTPATIENT)
Dept: FAMILY MEDICINE | Facility: CLINIC | Age: 75
End: 2025-06-06
Payer: COMMERCIAL

## 2025-06-06 NOTE — TELEPHONE ENCOUNTER
I'm unclear as to what is needed.  Patient already had a chest xray 5/28/25,so doesn't need another one now.  She has a CT scan scheduled for 6/16, and that is already ordered.

## 2025-06-09 NOTE — TELEPHONE ENCOUNTER
TC called patient and family for clarification on this request and PCA said she misunderstood the XR order needed to be schedule as stated on AVS. She thought it was something else and not the Xray/ TC reiterated that XR was done on the 5/28 and another XR is not needed. PCA verbalized understanding and clear on the on instruction now. Closing TE.

## 2025-06-16 ENCOUNTER — HOSPITAL ENCOUNTER (OUTPATIENT)
Dept: CT IMAGING | Facility: HOSPITAL | Age: 75
Discharge: HOME | End: 2025-06-16
Attending: FAMILY MEDICINE | Admitting: FAMILY MEDICINE
Payer: COMMERCIAL

## 2025-06-16 DIAGNOSIS — R93.89 ABNORMAL CHEST X-RAY: ICD-10-CM

## 2025-06-16 DIAGNOSIS — R91.8 LUNG FIELD ABNORMAL FINDING ON EXAMINATION: ICD-10-CM

## 2025-06-16 PROCEDURE — 71250 CT THORAX DX C-: CPT

## 2025-06-18 ENCOUNTER — RESULTS FOLLOW-UP (OUTPATIENT)
Dept: FAMILY MEDICINE | Facility: CLINIC | Age: 75
End: 2025-06-18
Payer: COMMERCIAL

## 2025-06-18 NOTE — TELEPHONE ENCOUNTER
"Writer attempt #1 to call patient with the help of an [facility] \"Tracy\"   regarding clinician's message below. Clinician's message relayed to patient's PCA, Grzegorz Love (CTC on file).    Ma is going to try to call patient's insurance to see if a DEXA scan is covered yet before scheduling the appointment.    Ma verbalizes understanding, agrees with plan and has no further questions.    GWENDOLYN DorseyN, RN, PHN   [facility]    "

## 2025-06-18 NOTE — TELEPHONE ENCOUNTER
Please call pt/family:    CT scan 6/16/25 looked GOOD.  There was a small lung nodule that is NOT cancer, and there was no scarring of the lungs.  We will plan to recheck a CT scan in 1 year to make sure that the lung nodule isn't growing.    Since everything looked good on this more advanced xray, she does NOT need to see a lung specialist.  I will cancel that referral (it looks like it never got scheduled anyway).    I DO still recommend getting DXA, which hasn't been scheduled.  Please help schedule.

## 2025-08-11 ENCOUNTER — PATIENT OUTREACH (OUTPATIENT)
Dept: GERIATRIC MEDICINE | Facility: CLINIC | Age: 75
End: 2025-08-11
Payer: COMMERCIAL

## 2025-08-21 ENCOUNTER — PATIENT OUTREACH (OUTPATIENT)
Dept: GERIATRIC MEDICINE | Facility: CLINIC | Age: 75
End: 2025-08-21
Payer: COMMERCIAL